# Patient Record
Sex: FEMALE | Race: OTHER | HISPANIC OR LATINO | Employment: FULL TIME | URBAN - METROPOLITAN AREA
[De-identification: names, ages, dates, MRNs, and addresses within clinical notes are randomized per-mention and may not be internally consistent; named-entity substitution may affect disease eponyms.]

---

## 2017-01-19 ENCOUNTER — GENERIC CONVERSION - ENCOUNTER (OUTPATIENT)
Dept: OTHER | Facility: OTHER | Age: 52
End: 2017-01-19

## 2017-03-24 ENCOUNTER — ALLSCRIPTS OFFICE VISIT (OUTPATIENT)
Dept: OTHER | Facility: OTHER | Age: 52
End: 2017-03-24

## 2017-03-24 DIAGNOSIS — M19.019 PRIMARY OSTEOARTHRITIS OF SHOULDER: ICD-10-CM

## 2017-03-24 DIAGNOSIS — Z12.31 ENCOUNTER FOR SCREENING MAMMOGRAM FOR MALIGNANT NEOPLASM OF BREAST: ICD-10-CM

## 2017-03-25 ENCOUNTER — HOSPITAL ENCOUNTER (OUTPATIENT)
Dept: RADIOLOGY | Facility: HOSPITAL | Age: 52
Discharge: HOME | End: 2017-03-25
Payer: COMMERCIAL

## 2017-03-25 DIAGNOSIS — M54.2 CERVICALGIA: ICD-10-CM

## 2017-03-25 DIAGNOSIS — M19.019 PRIMARY OSTEOARTHRITIS OF SHOULDER: ICD-10-CM

## 2017-03-25 PROCEDURE — 73030 X-RAY EXAM OF SHOULDER: CPT

## 2017-03-25 PROCEDURE — 72050 X-RAY EXAM NECK SPINE 4/5VWS: CPT

## 2017-03-27 ENCOUNTER — GENERIC CONVERSION - ENCOUNTER (OUTPATIENT)
Dept: OTHER | Facility: OTHER | Age: 52
End: 2017-03-27

## 2017-04-13 ENCOUNTER — TRANSCRIBE ORDERS (OUTPATIENT)
Dept: ADMINISTRATIVE | Facility: HOSPITAL | Age: 52
End: 2017-04-13

## 2017-04-13 DIAGNOSIS — R20.0 TACTILE ANESTHESIA: Primary | ICD-10-CM

## 2017-05-16 ENCOUNTER — ALLSCRIPTS OFFICE VISIT (OUTPATIENT)
Dept: OTHER | Facility: OTHER | Age: 52
End: 2017-05-16

## 2017-05-16 DIAGNOSIS — M54.50 LOW BACK PAIN: ICD-10-CM

## 2017-05-17 ENCOUNTER — GENERIC CONVERSION - ENCOUNTER (OUTPATIENT)
Dept: OTHER | Facility: OTHER | Age: 52
End: 2017-05-17

## 2017-05-22 ENCOUNTER — HOSPITAL ENCOUNTER (OUTPATIENT)
Dept: RADIOLOGY | Facility: HOSPITAL | Age: 52
Discharge: HOME | End: 2017-05-22
Payer: COMMERCIAL

## 2017-05-22 DIAGNOSIS — M54.50 LOW BACK PAIN: ICD-10-CM

## 2017-05-22 PROCEDURE — 72110 X-RAY EXAM L-2 SPINE 4/>VWS: CPT

## 2017-05-29 ENCOUNTER — GENERIC CONVERSION - ENCOUNTER (OUTPATIENT)
Dept: OTHER | Facility: OTHER | Age: 52
End: 2017-05-29

## 2017-06-09 ENCOUNTER — ALLSCRIPTS OFFICE VISIT (OUTPATIENT)
Dept: OTHER | Facility: OTHER | Age: 52
End: 2017-06-09

## 2017-06-16 ENCOUNTER — ALLSCRIPTS OFFICE VISIT (OUTPATIENT)
Dept: OTHER | Facility: OTHER | Age: 52
End: 2017-06-16

## 2017-06-16 DIAGNOSIS — M25.522 PAIN IN LEFT ELBOW: ICD-10-CM

## 2017-06-16 DIAGNOSIS — R53.83 OTHER FATIGUE: ICD-10-CM

## 2017-06-16 DIAGNOSIS — Z13.1 ENCOUNTER FOR SCREENING FOR DIABETES MELLITUS: ICD-10-CM

## 2017-06-16 DIAGNOSIS — Z13.220 ENCOUNTER FOR SCREENING FOR LIPOID DISORDERS: ICD-10-CM

## 2017-06-27 ENCOUNTER — APPOINTMENT (OUTPATIENT)
Dept: LAB | Facility: CLINIC | Age: 52
End: 2017-06-27

## 2017-06-27 ENCOUNTER — TRANSCRIBE ORDERS (OUTPATIENT)
Dept: LAB | Facility: CLINIC | Age: 52
End: 2017-06-27

## 2017-06-27 DIAGNOSIS — Z13.1 SCREENING FOR DIABETES MELLITUS: ICD-10-CM

## 2017-06-27 DIAGNOSIS — Z13.1 ENCOUNTER FOR SCREENING FOR DIABETES MELLITUS: ICD-10-CM

## 2017-06-27 DIAGNOSIS — R53.83 FATIGUE, UNSPECIFIED TYPE: Primary | ICD-10-CM

## 2017-06-27 DIAGNOSIS — R53.83 OTHER FATIGUE: ICD-10-CM

## 2017-06-27 DIAGNOSIS — Z13.220 ENCOUNTER FOR SCREENING FOR LIPOID DISORDERS: ICD-10-CM

## 2017-06-27 DIAGNOSIS — Z13.220 SCREENING FOR LIPOID DISORDERS: ICD-10-CM

## 2017-06-28 ENCOUNTER — APPOINTMENT (OUTPATIENT)
Dept: LAB | Facility: CLINIC | Age: 52
End: 2017-06-28
Payer: COMMERCIAL

## 2017-06-28 DIAGNOSIS — R53.83 FATIGUE, UNSPECIFIED TYPE: ICD-10-CM

## 2017-06-28 DIAGNOSIS — Z13.220 SCREENING FOR LIPOID DISORDERS: ICD-10-CM

## 2017-06-28 DIAGNOSIS — Z13.1 SCREENING FOR DIABETES MELLITUS: ICD-10-CM

## 2017-06-28 LAB
ALBUMIN SERPL BCP-MCNC: 4.1 G/DL (ref 3.5–5)
ALP SERPL-CCNC: 109 U/L (ref 46–116)
ALT SERPL W P-5'-P-CCNC: 24 U/L (ref 12–78)
ANION GAP SERPL CALCULATED.3IONS-SCNC: 8 MMOL/L (ref 4–13)
AST SERPL W P-5'-P-CCNC: 16 U/L (ref 5–45)
BACTERIA UR QL AUTO: ABNORMAL /HPF
BASOPHILS # BLD AUTO: 0.03 THOUSANDS/ΜL (ref 0–0.1)
BASOPHILS NFR BLD AUTO: 0 % (ref 0–1)
BILIRUB SERPL-MCNC: 0.35 MG/DL (ref 0.2–1)
BILIRUB UR QL STRIP: NEGATIVE
BUN SERPL-MCNC: 9 MG/DL (ref 5–25)
CALCIUM SERPL-MCNC: 9.5 MG/DL (ref 8.3–10.1)
CHLORIDE SERPL-SCNC: 106 MMOL/L (ref 100–108)
CHOLEST SERPL-MCNC: 192 MG/DL (ref 50–200)
CLARITY UR: ABNORMAL
CO2 SERPL-SCNC: 25 MMOL/L (ref 21–32)
COLOR UR: YELLOW
CREAT SERPL-MCNC: 0.59 MG/DL (ref 0.6–1.3)
CRP SERPL QL: <3 MG/L
EOSINOPHIL # BLD AUTO: 0.15 THOUSAND/ΜL (ref 0–0.61)
EOSINOPHIL NFR BLD AUTO: 2 % (ref 0–6)
ERYTHROCYTE [DISTWIDTH] IN BLOOD BY AUTOMATED COUNT: 12.7 % (ref 11.6–15.1)
ERYTHROCYTE [SEDIMENTATION RATE] IN BLOOD: 10 MM/HOUR (ref 0–20)
GFR SERPL CREATININE-BSD FRML MDRD: >60 ML/MIN/1.73SQ M
GLUCOSE P FAST SERPL-MCNC: 101 MG/DL (ref 65–99)
GLUCOSE UR STRIP-MCNC: NEGATIVE MG/DL
HCT VFR BLD AUTO: 42.8 % (ref 34.8–46.1)
HDLC SERPL-MCNC: 37 MG/DL (ref 40–60)
HGB BLD-MCNC: 14.9 G/DL (ref 11.5–15.4)
HGB UR QL STRIP.AUTO: NEGATIVE
HYALINE CASTS #/AREA URNS LPF: ABNORMAL /LPF
KETONES UR STRIP-MCNC: NEGATIVE MG/DL
LDLC SERPL CALC-MCNC: 117 MG/DL (ref 0–100)
LEUKOCYTE ESTERASE UR QL STRIP: ABNORMAL
LYMPHOCYTES # BLD AUTO: 1.93 THOUSANDS/ΜL (ref 0.6–4.47)
LYMPHOCYTES NFR BLD AUTO: 28 % (ref 14–44)
MCH RBC QN AUTO: 31.4 PG (ref 26.8–34.3)
MCHC RBC AUTO-ENTMCNC: 34.8 G/DL (ref 31.4–37.4)
MCV RBC AUTO: 90 FL (ref 82–98)
MONOCYTES # BLD AUTO: 0.53 THOUSAND/ΜL (ref 0.17–1.22)
MONOCYTES NFR BLD AUTO: 8 % (ref 4–12)
NEUTROPHILS # BLD AUTO: 4.15 THOUSANDS/ΜL (ref 1.85–7.62)
NEUTS SEG NFR BLD AUTO: 62 % (ref 43–75)
NITRITE UR QL STRIP: NEGATIVE
NON-SQ EPI CELLS URNS QL MICRO: ABNORMAL /HPF
NRBC BLD AUTO-RTO: 0 /100 WBCS
PH UR STRIP.AUTO: 6 [PH] (ref 4.5–8)
PLATELET # BLD AUTO: 259 THOUSANDS/UL (ref 149–390)
PMV BLD AUTO: 11.3 FL (ref 8.9–12.7)
POTASSIUM SERPL-SCNC: 3.7 MMOL/L (ref 3.5–5.3)
PROT SERPL-MCNC: 7.5 G/DL (ref 6.4–8.2)
PROT UR STRIP-MCNC: NEGATIVE MG/DL
RBC # BLD AUTO: 4.74 MILLION/UL (ref 3.81–5.12)
RBC #/AREA URNS AUTO: ABNORMAL /HPF
SODIUM SERPL-SCNC: 139 MMOL/L (ref 136–145)
SP GR UR STRIP.AUTO: 1.02 (ref 1–1.03)
TRIGL SERPL-MCNC: 190 MG/DL
TSH SERPL DL<=0.05 MIU/L-ACNC: 0.91 UIU/ML (ref 0.36–3.74)
UROBILINOGEN UR QL STRIP.AUTO: 0.2 E.U./DL
WBC # BLD AUTO: 6.8 THOUSAND/UL (ref 4.31–10.16)
WBC #/AREA URNS AUTO: ABNORMAL /HPF

## 2017-06-28 PROCEDURE — 86430 RHEUMATOID FACTOR TEST QUAL: CPT

## 2017-06-28 PROCEDURE — 80061 LIPID PANEL: CPT

## 2017-06-28 PROCEDURE — 85652 RBC SED RATE AUTOMATED: CPT

## 2017-06-28 PROCEDURE — 86618 LYME DISEASE ANTIBODY: CPT

## 2017-06-28 PROCEDURE — 80053 COMPREHEN METABOLIC PANEL: CPT

## 2017-06-28 PROCEDURE — 85025 COMPLETE CBC W/AUTO DIFF WBC: CPT

## 2017-06-28 PROCEDURE — 86140 C-REACTIVE PROTEIN: CPT

## 2017-06-28 PROCEDURE — 86038 ANTINUCLEAR ANTIBODIES: CPT

## 2017-06-28 PROCEDURE — 84443 ASSAY THYROID STIM HORMONE: CPT

## 2017-06-28 PROCEDURE — 36415 COLL VENOUS BLD VENIPUNCTURE: CPT

## 2017-06-28 PROCEDURE — 81001 URINALYSIS AUTO W/SCOPE: CPT

## 2017-06-29 LAB
B BURGDOR IGG SER IA-ACNC: 0.09
B BURGDOR IGM SER IA-ACNC: 0.75
RHEUMATOID FACT SER QL LA: NEGATIVE

## 2017-06-30 ENCOUNTER — ALLSCRIPTS OFFICE VISIT (OUTPATIENT)
Dept: OTHER | Facility: OTHER | Age: 52
End: 2017-06-30

## 2017-06-30 LAB — RYE IGE QN: NEGATIVE

## 2017-07-04 ENCOUNTER — GENERIC CONVERSION - ENCOUNTER (OUTPATIENT)
Dept: OTHER | Facility: OTHER | Age: 52
End: 2017-07-04

## 2017-07-27 ENCOUNTER — TRANSCRIBE ORDERS (OUTPATIENT)
Dept: ADMINISTRATIVE | Facility: HOSPITAL | Age: 52
End: 2017-07-27

## 2017-07-27 ENCOUNTER — ALLSCRIPTS OFFICE VISIT (OUTPATIENT)
Dept: OTHER | Facility: OTHER | Age: 52
End: 2017-07-27

## 2017-07-27 ENCOUNTER — LAB REQUISITION (OUTPATIENT)
Dept: LAB | Facility: HOSPITAL | Age: 52
End: 2017-07-27
Payer: COMMERCIAL

## 2017-07-27 DIAGNOSIS — R35.0 FREQUENCY OF MICTURITION: ICD-10-CM

## 2017-07-27 DIAGNOSIS — N20.0 CALCULUS OF KIDNEY: Primary | ICD-10-CM

## 2017-07-27 DIAGNOSIS — N20.0 CALCULUS OF KIDNEY: ICD-10-CM

## 2017-07-27 LAB
BILIRUB UR QL STRIP: NEGATIVE
BILIRUB UR QL STRIP: NORMAL
CLARITY UR: CLEAR
CLARITY UR: NORMAL
COLOR UR: YELLOW
COLOR UR: YELLOW
GLUCOSE (HISTORICAL): NORMAL
GLUCOSE UR STRIP-MCNC: NEGATIVE MG/DL
HGB UR QL STRIP.AUTO: NEGATIVE
HGB UR QL STRIP.AUTO: NORMAL
KETONES UR STRIP-MCNC: NEGATIVE MG/DL
KETONES UR STRIP-MCNC: NORMAL MG/DL
LEUKOCYTE ESTERASE UR QL STRIP: NEGATIVE
LEUKOCYTE ESTERASE UR QL STRIP: NORMAL
NITRITE UR QL STRIP: NEGATIVE
NITRITE UR QL STRIP: NORMAL
PH UR STRIP.AUTO: 6 [PH]
PH UR STRIP.AUTO: 6.5 [PH] (ref 4.5–8)
PROT UR STRIP-MCNC: NEGATIVE MG/DL
PROT UR STRIP-MCNC: NORMAL MG/DL
SP GR UR STRIP.AUTO: 1
SP GR UR STRIP.AUTO: 1 (ref 1–1.03)
UROBILINOGEN UR QL STRIP.AUTO: 0.2
UROBILINOGEN UR QL STRIP.AUTO: 0.2 E.U./DL

## 2017-07-27 PROCEDURE — 81003 URINALYSIS AUTO W/O SCOPE: CPT | Performed by: PHYSICIAN ASSISTANT

## 2017-07-28 ENCOUNTER — GENERIC CONVERSION - ENCOUNTER (OUTPATIENT)
Dept: OTHER | Facility: OTHER | Age: 52
End: 2017-07-28

## 2017-07-31 ENCOUNTER — HOSPITAL ENCOUNTER (OUTPATIENT)
Dept: CT IMAGING | Facility: HOSPITAL | Age: 52
Discharge: HOME | End: 2017-07-31
Payer: COMMERCIAL

## 2017-07-31 DIAGNOSIS — N20.0 CALCULUS OF KIDNEY: ICD-10-CM

## 2017-07-31 PROCEDURE — 74176 CT ABD & PELVIS W/O CONTRAST: CPT

## 2017-08-01 ENCOUNTER — GENERIC CONVERSION - ENCOUNTER (OUTPATIENT)
Dept: OTHER | Facility: OTHER | Age: 52
End: 2017-08-01

## 2017-09-15 ENCOUNTER — GENERIC CONVERSION - ENCOUNTER (OUTPATIENT)
Dept: OTHER | Facility: OTHER | Age: 52
End: 2017-09-15

## 2017-09-15 ENCOUNTER — LAB REQUISITION (OUTPATIENT)
Dept: LAB | Facility: HOSPITAL | Age: 52
End: 2017-09-15
Payer: COMMERCIAL

## 2017-09-15 ENCOUNTER — APPOINTMENT (OUTPATIENT)
Dept: LAB | Facility: HOSPITAL | Age: 52
End: 2017-09-15
Payer: COMMERCIAL

## 2017-09-15 DIAGNOSIS — F11.90 UNCOMPLICATED OPIOID USE: ICD-10-CM

## 2017-09-15 DIAGNOSIS — S12.9XXA FRACTURE OF CERVICAL VERTEBRA (HCC): ICD-10-CM

## 2017-09-15 DIAGNOSIS — M54.12 RADICULOPATHY OF CERVICAL REGION: ICD-10-CM

## 2017-09-15 PROCEDURE — 80365 DRUG SCREENING OXYCODONE: CPT | Performed by: PHYSICIAN ASSISTANT

## 2017-09-15 PROCEDURE — 80307 DRUG TEST PRSMV CHEM ANLYZR: CPT | Performed by: PHYSICIAN ASSISTANT

## 2017-09-21 LAB
OXYCODONE UR QL CFM: ABNORMAL NG/ML
OXYCODONE+OXYMORPHONE SERPLBLD QL SCN: POSITIVE
OXYCODONE+OXYMORPHONE UR QL SCN: NORMAL NG/ML
OXYCODONE+OXYMORPHONE UR QL SCN: POSITIVE
OXYMORPHONE UR CFM-MCNC: ABNORMAL NG/ML
OXYMORPHONE UR QL CFM: POSITIVE

## 2017-09-24 ENCOUNTER — GENERIC CONVERSION - ENCOUNTER (OUTPATIENT)
Dept: OTHER | Facility: OTHER | Age: 52
End: 2017-09-24

## 2017-09-29 LAB
AMPHETAMINES UR QL SCN: NEGATIVE NG/ML
BARBITURATES UR QL SCN: NEGATIVE NG/ML
BENZODIAZ UR QL SCN: NEGATIVE NG/ML
BZE UR QL SCN: NEGATIVE NG/ML
CANNABINOIDS UR QL SCN: NEGATIVE NG/ML
METHADONE UR QL SCN: NEGATIVE NG/ML
OPIATES UR QL: NEGATIVE
PCP UR QL: NEGATIVE NG/ML
PROPOXYPH UR QL: NEGATIVE NG/ML

## 2017-10-01 ENCOUNTER — GENERIC CONVERSION - ENCOUNTER (OUTPATIENT)
Dept: OTHER | Facility: OTHER | Age: 52
End: 2017-10-01

## 2017-10-17 ENCOUNTER — ALLSCRIPTS OFFICE VISIT (OUTPATIENT)
Dept: OTHER | Facility: OTHER | Age: 52
End: 2017-10-17

## 2017-10-19 NOTE — PROGRESS NOTES
Assessment  1  Pruritic rash (698 2) (L28 2)    Plan  Pruritic rash    · PredniSONE 10 MG Oral Tablet; TAKE 4 TABLETS DAILY FOR 3 DAYS,3 TABLETS  DAILY FOR 3 DAYS, 2 TABLETS DAILY FOR 3 DAYS AND 1 TABLET DAILY FOR 3  DAYS, THEN STOP    Discussion/Summary    Informed patient that rash may be due to the cleaning products that she uses at work  Make sure she wears appropriate protection when handling products  Would also recommending using soaps and detergents without fragments is  Prescribed prednisone to see if that helps with rash  If there is no improvement in symptoms make follow-up appointment  Possible side effects of new medications were reviewed with the patient/guardian today  The treatment plan was reviewed with the patient/guardian  The patient/guardian understands and agrees with the treatment plan     Self Referrals: No      Chief Complaint  1  Rash  Pt has been having rash x 3 days in different areas including her hands, pt states has a lot of itching and has been taking benadryl without any relief  History of Present Illness  HPI: 44-year-old female presenting for whole-body pruritic rash x3 days  Patient works  at [employer]  Patient has had several problems with 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 work over the past year  States that the rash started on her hands that appeared on her lower extremities and spread up her legs to her lower back  States that the rash is pruritic and has been taking Benadryl without any relief  Denies any changes in soaps, or detergents  Patient has no known food allergies  Denies any fevers, chills, difficulty breathing, wheezing  Review of Systems    Constitutional: No fever, no chills, feels well, no tiredness, no recent weight gain or loss  ENT: no ear ache, no loss of hearing, no nosebleeds or nasal discharge, no sore throat or hoarseness     Respiratory: no complaints of shortness of breath, no wheezing, no dyspnea on exertion, no orthopnea or PND  Integumentary: as noted in HPI  ROS reviewed  Active Problems  1  Anxiety disorder (300 00) (F41 9)   2  Arm weakness (729 89) (R29 898)   3  Asthma (493 90) (J45 909)   4  Biceps tendinopathy (727 9) (M67 929)   5  Cervical radiculopathy (723 4) (M54 12)   6  Chronic GERD (530 81) (K21 9)   7  Chronic left shoulder pain (719 41,338 29) (M25 512,G89 29)   8  Closed Fracture Of The Body Of The Navicular Bone Of The Left Foot (825 22)   9  Colon cancer screening (V76 51) (Z12 11)   10  Compression fracture of cervical vertebra (805 00) (S12 9XXA)   11  Cyst of skin (706 2) (L72 9)   12  Depression with anxiety (300 4) (F41 8)   13  Edema leg (782 3) (R60 0)   14  Elbow pain, right (719 42) (M25 521)   15  Encounter for mammogram to establish baseline mammogram (V76 12) (Z12 31)   16  Encounter for screening mammogram for breast cancer (V76 12) (Z12 31)   17  Esophagitis, reflux (530 11) (K21 0)   18  Fatigue (780 79) (R53 83)   19  Flank pain (789 09) (R10 9)   20  Gastroparesis (536 3) (K31 84)   21  Greater trochanteric bursitis of left hip (726 5) (M70 62)   22  Hip pain, acute, left (719 45) (M25 552)   23  Hyperlipidemia (272 4) (E78 5)   24  Hypertension (401 9) (I10)   25  Irritable bowel syndrome (564 1) (K58 9)   26  Left arm numbness (782 0) (R20 0)   27  Left elbow pain (719 42) (M25 522)   28  Low back pain (724 2) (M54 5)   29  Migraine headache (346 90) (G43 909)   30  Narcotic drug use (305 50) (F11 90)   31  Neck pain (723 1) (M54 2)   32  Nephrolithiasis (592 0) (N20 0)   33  Numbness Of Both Hands (782 0)   34  Osteoarthritis of shoulder (715 91) (M19 019)   35  Other chronic pain (338 29) (G89 29)   36  Pain in joint of right wrist (719 43) (M25 531)   37  Pain medication agreement (V58 69) (Z02 89)   38  Pancreatic disorder (577 9) (K86 9)   39  Prediabetes (790 29) (R73 09)   40  Pre-procedural cardiovascular examination (V72 81) (Z01 810)   41   Right arm numbness (782 0) (R20 2)   42  Screening cholesterol level (V77 91) (Z13 220)   43  Screening for diabetes mellitus (DM) (V77 1) (Z13 1)   44  Seizure, epileptic (345 90) (G40 909)   45  Seizures (780 39) (R56 9)   46  Shoulder joint pain, unspecified laterality   47  Tarsal tunnel syndrome, unspecified laterality (355 5) (G57 50)   48  Tongue laceration (873 64) (S01 512A)   49  Urinary frequency (788 41) (R35 0)   50  UTI (lower urinary tract infection) (599 0) (N39 0)   51  Vitamin D deficiency (268 9) (E55 9)    Past Medical History  1  Denied: History of drug abuse   2  History of Pain in joint of right wrist (719 43) (M25 531)    Family History  Mother    1  Family history of Coronary Artery Disease (V17 49)   2  Family history of Diabetes Mellitus (V18 0)   3  Family history of depression (V17 0) (Z81 8)   4  Family history of Gastric Cancer (V16 0)  Father    5  Family history of Coronary Artery Disease (V17 49)   6  Family history of alcohol abuse (V61 41) (Z81 1)   7  Family history of Hypertension (V17 49)   8  Family history of Renal Failure    Social History   · Current Every Day Smoker (305 1)   · Narcotic drug use (305 50) (F11 90)   · No alcohol use   · No drug use   · Patient unsure of advance healthcare directive status   · Primary language is Mohawk   · [employer]    Surgical History  1  History of Cholecystectomy Laparoscopic   2  History of Condylotomy Of TMJ   3  History of Foot Surgery   4  History of Hysterectomy   5  History of Ovarian Cystectomy   6  History of Shoulder Surgery   7  History of Tubal Ligation   8  History of Wrist Surgery    Current Meds   1  Advair Diskus 500-50 MCG/DOSE Inhalation Aerosol Powder Breath Activated; inhale 1   puff twice daily; Therapy: 49Fpc7860 to ([id number])  Requested for: 70VME6024; Last   Rx:11Oct2017 Ordered   2  Dicyclomine HCl - 10 MG Oral Capsule; TAKE 2 CAPSULES 4 TIMES DAILY;    Therapy: 94Wcp4707 to (Renew:16Oct2014)  Requested for: 15Njz2134; Last   Rx:18Lgw7105 Ordered   3  Fenofibrate 145 MG Oral Tablet; TAKE 1 TABLET DAILY; Therapy: 14EIH7597 to (Evaluate:14Mar2018)  Requested for: 27Hsg1295; Last   Rx:02Cyh4288 Ordered   4  Furosemide 20 MG Oral Tablet; TAKE 1 TABLET DAILY AS DIRECTED; Therapy: 63OXY4190 to (Evaluate:12Apr2017)  Requested for: 54HMN1464; Last   Rx:45Nww2665 Ordered   5  HydrOXYzine HCl - 50 MG Oral Tablet; TAKE 1 TABLET 3-4 TIMES DAILY; Therapy: 77LZR4346 to (Evaluate:85Fyu5289)  Requested for: 77JNV9968; Last   Rx:01Nil2946 Ordered   6  LamoTRIgine 100 MG Oral Tablet; TAKE 1 TABLET DAILY; Therapy: 35ZWZ6852 to Recorded   7  Lisinopril 10 MG Oral Tablet; take one tablet by mouth daily; Therapy: 01Apr2016 to (Evaluate:13Mar2018)  Requested for: 31Dnf7424; Last   Rx:55Wfs7431 Ordered   8  Methocarbamol 500 MG Oral Tablet; TAKE 1 TABLET 3 TIMES DAILY; Therapy: 19KJN9481 to (Evaluate:03Jan2018)  Requested for: 72URZ9798; Last   Rx:66Ocr4986 Ordered   9  Montelukast Sodium 10 MG Oral Tablet; TAKE 1 TABLET DAILY; Therapy: 79Mrf2460 to (Evaluate:14Mar2018)  Requested for: 27Pob4380; Last   Rx:17Yuz8734 Ordered   10  Omeprazole 20 MG Oral Capsule Delayed Release; TAKE 1 CAPSULE DAILY; Therapy: 70IUX3668 to (Evaluate:15Mar2017)  Requested for: 55GXR8594; Last    Rx:15Aeo5713 Ordered   11  OxyCODONE HCl - 30 MG Oral Tablet; TAKE 1 TABLET EVERY 6 HOURS AS NEEDED    FOR PAIN;    Therapy: 29CQQ3977 to (Evaluate:11Nov2017); Last Rx:13Oct2017 Ordered   12  PARoxetine HCl - 20 MG Oral Tablet; TAKE 1 TABLET BY MOUTH DAILY   AT BEDTIME; Therapy: 10CQK3464 to (Evaluate:29Apr2016)  Requested for: 81GCF3174; Last    Rx:30Mar2016 Ordered   13  Simvastatin 20 MG Oral Tablet; TAKE TAKE ONE TABLET DAILY; Therapy: 69EBK0684 to (Evaluate:12Apr2017)  Requested for: 32RKP2382; Last    Rx:14Oct2016 Ordered   14   Ventolin  (90 Base) MCG/ACT Inhalation Aerosol Solution; INHALE 1 - 2 PUFFS    BY MOUTH EVERY 4-6 HOURS AS NEEDED; Therapy: 18Apr2016 to (Evaluate:25Nov2017)  Requested for: 50SXF3438; Last    Rx:76Ymd8891 Ordered    Allergies  1  Aspirin Buffered TABS   2  Codeine Sulfate TABS   3  Compazine TABS   4  Gabapentin CAPS   5  Imitrex TABS   6  Morphine Sulfate TABS   7  Toradol Oral TABS   8  Vicodin TABS    Vitals   Recorded: 85IMV0496 10:24AM   Temperature 96 F, Tympanic   Heart Rate 80   Respiration 18   Systolic 161   Diastolic 60   Height 5 ft 2 in   Weight 126 lb    BMI Calculated 23 05   BSA Calculated 1 57     Physical Exam    Constitutional   General appearance: No acute distress, well appearing and well nourished  Pulmonary   Respiratory effort: No increased work of breathing or signs of respiratory distress  Auscultation of lungs: Clear to auscultation  Cardiovascular   Auscultation of heart: Normal rate and rhythm, normal S1 and S2, without murmurs  Skin   Examination of the skin for lesions: Abnormal  -- Erythematous patches and wheals noted on bilateral hands, and bilateral lower extremities          Signatures   Electronically signed by : DAMIR Persaud; Oct 17 2017 12:30PM EST                       (Author)    Electronically signed by : LAYLA Herrera ; Oct 18 2017  1:07PM EST

## 2017-11-09 ENCOUNTER — GENERIC CONVERSION - ENCOUNTER (OUTPATIENT)
Dept: OTHER | Facility: OTHER | Age: 52
End: 2017-11-09

## 2017-11-09 DIAGNOSIS — M79.672 PAIN OF LEFT FOOT: ICD-10-CM

## 2017-11-15 ENCOUNTER — HOSPITAL ENCOUNTER (OUTPATIENT)
Dept: RADIOLOGY | Facility: HOSPITAL | Age: 52
Discharge: HOME | End: 2017-11-15
Payer: COMMERCIAL

## 2017-11-15 DIAGNOSIS — M79.672 PAIN OF LEFT FOOT: ICD-10-CM

## 2017-11-15 PROCEDURE — 73630 X-RAY EXAM OF FOOT: CPT

## 2017-11-21 ENCOUNTER — GENERIC CONVERSION - ENCOUNTER (OUTPATIENT)
Dept: OTHER | Facility: OTHER | Age: 52
End: 2017-11-21

## 2018-01-02 ENCOUNTER — ALLSCRIPTS OFFICE VISIT (OUTPATIENT)
Dept: OTHER | Facility: OTHER | Age: 53
End: 2018-01-02

## 2018-01-03 NOTE — PROGRESS NOTES
Assessment   1  Anxiety disorder (300 00) (F41 9)   2  Compression fracture of cervical vertebra (805 00) (S12 9XXA)   3  Chronic left shoulder pain (719 41,338 29) (M25 512,G89 29)    Plan   Chronic left shoulder pain, Greater trochanteric bursitis of left hip    · OxyCODONE HCl - 30 MG Oral Tablet; TAKE 1 TABLET EVERY 6 HOURS AS    NEEDED FOR PAIN  Depression with anxiety    · PARoxetine HCl - 20 MG Oral Tablet; TAKE 1 TABLET BY MOUTH DAILY   AT    BEDTIME    Discussion/Summary      For anxiety informed patient that with the crease stressors may be increasing her symptoms of anxiety  To help better control this would be to decrease the amount of stressors at home and at work  In the meantime will also start again on Paxil to see if this helps  If symptoms do improve can always consider stopping the Paxil again in the future  If there is any concern with continuing chest tightness and palpitations would recommend making a follow-up appointment  We will consider doing a Holter monitor at that time  compression fracture and also chronic pain will continue with the oxycodone  Urine drug screen in September came back positive for oxycodone and negative for everything else  [place] drug database was reviewed today, and no concerns at this time  in 3 months  Possible side effects of new medications were reviewed with the patient/guardian today  The treatment plan was reviewed with the patient/guardian  The patient/guardian understands and agrees with the treatment plan       Self Referrals: No      Chief Complaint   [facility] ED FU for anxiety, pt states she is still feeling the symptoms  History of Present Illness   HPI: 50-year-old female presenting for follow-up of anxiety  Patient states he was having an anxiety attack at work on Sunday and went to the emergency room from there  States that that time she was having tachycardia, palpitations, shortness of breath, shaking   It is also noted that her blood pressure was elevated  Patient had a cardiac workup which was negative in the emergency room  Lab work was also normal  Patient states that she is doing better today  Is still getting palpitations, and also episodes shaking this  Currently she is on hydroxyzine 4 times a day as needed for anxiety  In the past she was also on Paxil daily  Has not had medication in over a year  Recently there has been increased stressors at work  She has been doing double shifts  Nose also increase stressors at home  She states she was recently kicked out of her house by her daughter-in-law  Has found a new place to live though  is also due for refill of her oxycodone  States medication has been helping with the pain  No concerns or questions today  Review of Systems        Constitutional: no fever,-- not feeling poorly-- and-- no chills  Cardiovascular: palpitations, but-- the heart rate was not slow,-- no chest pain,-- no intermittent leg claudication,-- the heart rate was not fast-- and-- no lower extremity edema  Respiratory: no complaints of shortness of breath, no wheezing, no dyspnea on exertion, no orthopnea or PND  Gastrointestinal: no complaints of abdominal pain, no constipation, no nausea or diarrhea, no vomiting, no bloody stools  Neurological: as noted in HPI  ROS reviewed  Active Problems   1  Anxiety disorder (300 00) (F41 9)   2  Arm weakness (729 89) (R29 898)   3  Asthma (493 90) (J45 909)   4  Biceps tendinopathy (727 9) (M67 929)   5  Cervical radiculopathy (723 4) (M54 12)   6  Chronic GERD (530 81) (K21 9)   7  Chronic left shoulder pain (719 41,338 29) (M25 512,G89 29)   8  Closed Fracture Of The Body Of The Navicular Bone Of The Left Foot (825 22)   9  Colon cancer screening (V76 51) (Z12 11)   10  Compression fracture of cervical vertebra (805 00) (S12 9XXA)   11  Cyst of skin (706 2) (L72 9)   12  Depression with anxiety (300 4) (F41 8)   13   Edema leg (782 3) (R60 0) 14  Elbow pain, right (719 42) (M25 521)   15  Encounter for mammogram to establish baseline mammogram (V76 12) (Z12 31)   16  Encounter for screening mammogram for breast cancer (V76 12) (Z12 31)   17  Esophagitis, reflux (530 11) (K21 0)   18  Fatigue (780 79) (R53 83)   19  Flank pain (789 09) (R10 9)   20  Gastroparesis (536 3) (K31 84)   21  Greater trochanteric bursitis of left hip (726 5) (M70 62)   22  Hip pain, acute, left (719 45) (M25 552)   23  Hyperlipidemia (272 4) (E78 5)   24  Hypertension (401 9) (I10)   25  Irritable bowel syndrome (564 1) (K58 9)   26  Left arm numbness (782 0) (R20 0)   27  Left elbow pain (719 42) (M25 522)   28  Left foot pain (729 5) (M79 672)   29  Low back pain (724 2) (M54 5)   30  Migraine headache (346 90) (G43 909)   31  Narcotic drug use (305 50) (F11 90)   32  Neck pain (723 1) (M54 2)   33  Nephrolithiasis (592 0) (N20 0)   34  Numbness Of Both Hands (782 0)   35  Osteoarthritis of shoulder (715 91) (M19 019)   36  Other chronic pain (338 29) (G89 29)   37  Pain in joint of right wrist (719 43) (M25 531)   38  Pain medication agreement (V58 69) (Z02 89)   39  Pancreatic disorder (577 9) (K86 9)   40  Prediabetes (790 29) (R73 09)   41  Pre-procedural cardiovascular examination (V72 81) (Z01 810)   42  Pruritic rash (698 2) (L28 2)   43  Right arm numbness (782 0) (R20 2)   44  Screening cholesterol level (V77 91) (Z13 220)   45  Screening for diabetes mellitus (DM) (V77 1) (Z13 1)   46  Seizure, epileptic (345 90) (G40 909)   47  Seizures (780 39) (R56 9)   48  Shoulder joint pain, unspecified laterality   49  Tarsal tunnel syndrome, unspecified laterality (355 5) (G57 50)   50  Tongue laceration (873 64) (S01 512A)   51  Urinary frequency (788 41) (R35 0)   52  UTI (lower urinary tract infection) (599 0) (N39 0)   53  Vitamin D deficiency (268 9) (E55 9)    Past Medical History   1  Denied: History of drug abuse   2   History of Pain in joint of right wrist (719 43) (M23 56)    Family History   Mother    1  Family history of Coronary Artery Disease (V17 49)   2  Family history of Diabetes Mellitus (V18 0)   3  Family history of depression (V17 0) (Z81 8)   4  Family history of Gastric Cancer (V16 0)  Father    5  Family history of Coronary Artery Disease (V17 49)   6  Family history of alcohol abuse (V61 41) (Z81 1)   7  Family history of Hypertension (V17 49)   8  Family history of Renal Failure    Social History    · Current Every Day Smoker (305 1)   · Narcotic drug use (305 50) (F11 90)   · No alcohol use   · No drug use   · Patient unsure of advance healthcare directive status   · Primary language is [place]   · [employer]    Surgical History   1  History of Cholecystectomy Laparoscopic   2  History of Condylotomy Of TMJ   3  History of Foot Surgery   4  History of Hysterectomy   5  History of Ovarian Cystectomy   6  History of Shoulder Surgery   7  History of Tubal Ligation   8  History of Wrist Surgery    Current Meds    1  Advair Diskus 500-50 MCG/DOSE Inhalation Aerosol Powder Breath Activated; inhale 1     puff twice daily; Therapy: 99Usk1560 to (0489 33 97 26)  Requested for: 24HFU5204; Last     Rx:11Oct2017 Ordered   2  Dicyclomine HCl - 10 MG Oral Capsule; TAKE 2 CAPSULES 4 TIMES DAILY; Therapy: 16Rkp7345 to (Renew:16Oct2014)  Requested for: 71Gwf8654; Last     Rx:90Uzj9209 Ordered   3  Fenofibrate 145 MG Oral Tablet; TAKE 1 TABLET DAILY; Therapy: 08WJM6387 to (Evaluate:05Jun2018)  Requested for: 14CAW3763; Last     Rx:37Mkf8476 Ordered   4  Furosemide 20 MG Oral Tablet; TAKE 1 TABLET DAILY AS DIRECTED; Therapy: 15ZTL3782 to (Evaluate:12Apr2017)  Requested for: 42KJA2324; Last     Rx:92Fxa8597 Ordered   5  HydrOXYzine HCl - 50 MG Oral Tablet; TAKE 1 TABLET 3-4 TIMES DAILY; Therapy: 10PXB1438 to (78 649 946)  Requested for: 53-41-43-35; Last     RQ:20LKO8547 Ordered   6   LamoTRIgine 100 MG Oral Tablet; TAKE 1 TABLET DAILY; Therapy: 63UQX8881 to Recorded   7  Lisinopril 10 MG Oral Tablet; take one tablet by mouth daily; Therapy: 24Rvz3989 to ([place])  Requested for: 21Nov2017; Last     Rx:21Nov2017 Ordered   8  Methocarbamol 500 MG Oral Tablet; TAKE 1 TABLET 3 TIMES DAILY; Therapy: 64QCY3702 to (Evaluate:15Mar2018)  Requested for: 31NLS3198; Last     Rx:44Ylm7566 Ordered   9  Montelukast Sodium 10 MG Oral Tablet; TAKE 1 TABLET DAILY; Therapy: 01Zgq1549 to (Evaluate:05Jun2018)  Requested for: 14AOY7718; Last     Rx:42Gij0844 Ordered   10  Omeprazole 20 MG Oral Capsule Delayed Release; TAKE 1 CAPSULE DAILY; Therapy: 83NFG4481 to (Evaluate:15Mar2017)  Requested for: 50EGO0123; Last      Rx:89Baa5759 Ordered   11  OxyCODONE HCl - 30 MG Oral Tablet; TAKE 1 TABLET EVERY 6 HOURS AS NEEDED      FOR PAIN;      Therapy: 99VZN7641 to (Evaluate:05Jan2018); Last Rx:75Eic4089 Ordered   12  PARoxetine HCl - 20 MG Oral Tablet; TAKE 1 TABLET BY MOUTH DAILY   AT BEDTIME; Therapy: 50WQI2720 to (Evaluate:29Apr2016)  Requested for: 19FSA6542; Last      Rx:30Mar2016 Ordered   13  PredniSONE 10 MG Oral Tablet; TAKE 4 TABLETS DAILY FOR 3 DAYS,3 TABLETS      DAILY FOR 3 DAYS, 2 TABLETS DAILY FOR 3 DAYS AND 1 TABLET DAILY FOR      3 DAYS, THEN STOP; Therapy: 49PAX1079 to (96 260980)  Requested for: 58AAD8037; Last      Rx:74Plx0461 Ordered   14  Simvastatin 20 MG Oral Tablet; TAKE TAKE ONE TABLET DAILY; Therapy: 60WYX2169 to (Evaluate:12Apr2017)  Requested for: 82SUD5989; Last      Rx:68Bgi9786 Ordered   15  Ventolin  (90 Base) MCG/ACT Inhalation Aerosol Solution; INHALE 1 - 2 PUFFS      BY MOUTH EVERY 4-6 HOURS AS NEEDED; Therapy: 14Vaj1848 to (Evaluate:98Olt4291)  Requested for: 24XPQ9582; Last      Rx:39Wcm4990 Ordered     The medication list was reviewed and updated today  Allergies   1  Aspirin Buffered TABS   2  Codeine Sulfate TABS   3  Compazine TABS   4  Gabapentin CAPS   5   Imitrex TABS   6  Morphine Sulfate TABS   7  Toradol Oral TABS   8  Vicodin TABS    Vitals    Recorded: 29ZIV0017 08:37AM   Temperature 94 9 F   Heart Rate 98   Respiration 18   Systolic 347   Diastolic 84   Height 5 ft 2 in   Weight 118 lb 2 oz   BMI Calculated 21 61   BSA Calculated 1 53   O2 Saturation 99     Physical Exam        Constitutional      General appearance: No acute distress, well appearing and well nourished  Eyes      Conjunctiva and lids: No swelling, erythema or discharge  Pupils and irises: Equal, round and reactive to light  Pulmonary      Respiratory effort: No increased work of breathing or signs of respiratory distress  Auscultation of lungs: Clear to auscultation  Cardiovascular      Auscultation of heart: Normal rate and rhythm, normal S1 and S2, without murmurs  Examination of extremities for edema and/or varicosities: Normal        Abdomen      Abdomen: Non-tender, no masses  Liver and spleen: No hepatomegaly or splenomegaly  Lymphatic      Palpation of lymph nodes in neck: No lymphadenopathy  Neurologic      Cranial nerves: Cranial nerves 2-12 intact         Psychiatric      Orientation to person, place, and time: Normal        Mood and affect: Normal           Signatures    Electronically signed by : DAMIR Koehler; Jan 2 2018  9:29AM EST                       (Author)     Electronically signed by : LAYLA Frey ; Jan 2 2018  9:51AM EST

## 2018-01-09 NOTE — RESULT NOTES
Verified Results  * XR SPINE CERVICAL COMPLETE 4 OR 5 VW NON INJURY 25Mar2017 09:33AM Benji Ureña Order Number: [id number]     Test Name Result Flag Reference   XR SPINE CERVICAL COMPLETE 4 OR 5 VW (Report)     CERVICAL SPINE     INDICATION: Cervicalgia  COMPARISON: Cervical spine x-ray dated November 7, 2006  VIEWS: 5     IMAGES: 5     FINDINGS:     No evidence of fracture or subluxation  There is mild chronic-appearing compression deformity with sclerotic changes at C5 and C6  These findings are new when compared to the prior exam      There is mild disc space narrowing at C5/C6  The neural foramina are patent  The prevertebral soft tissues are within normal limits  The lung apices are intact  IMPRESSION:     No acute cervical spine fracture or subluxation  Mild multilevel spondylitic degenerative changes  Minimal/mild chronic-appearing compression deformity of the C5 and C6 vertebrae with sclerotic changes  If symptoms of cervicalgia persist, an MRI of the cervical spine could be performed for further evaluation  Workstation performed: [id number]     Signed by:   Cece Benavidez MD   3/26/17     * XR SHOULDER 2+ VIEW LEFT 82OYE7184 09:33AM Benjijeaneth Reevessapphire Order Number: [id number]     Test Name Result Flag Reference   XR SHOULDER 2+ VW LEFT (Report)     LEFT SHOULDER     INDICATION: Left shoulder pain     COMPARISON: 5/29/2016     VIEWS: 3     IMAGES: 4     FINDINGS:     There is no acute fracture or dislocation  The left AC joint appears widened, either postoperative or posttraumatic, but unchanged from prior  There is arthritis of the glenohumeral joint with marginal hypertrophic spurring     No lytic or blastic lesions are seen  Soft tissues are unremarkable  IMPRESSION:       1  Unchanged widening of the left acromial clavicular joint   2   Left glenohumeral joint osteoarthritis       Workstation performed: [id number]     Signed by:   Bebo Riley MD   3/26/17       Plan  Colon cancer screening    · COLONOSCOPY; Status:Active; Requested for:24Mar2017;   Encounter for screening mammogram for breast cancer    · * MAMMO SCREENING BILATERAL W CAD; Status:Active;  Requested for:24Mar2017;   Left arm numbness    · EMG TWO EXTREMITIES WITH OR W/O RELATED PARASPINAL AREAS; Status:Hold  For - Scheduling; Requested for:27Mar2017;   TWO : DAVID  ONE : KRISTINA

## 2018-01-10 NOTE — RESULT NOTES
Verified Results  (1) OXYCODONE/OXYMORPHONE, URINE 46Kbh0894 03:34PM Bakari Nava     Test Name Result Flag Reference   OXYCODONE/OXYMORPHONE Positive A Zdoycx=790   Test includes Oxycodone and Oxymorphone   OXYCODONE Positive A    OXYMORPHONE Positive A    OXYMORPHONE GC/MS >71360 ng/mL  Jjezqc=307   Performed at:  [address] [place], [place]  [id number]  : Chikis Motley MD, Phone:  [phone]   OXYCODONE (GC/MS) >23214 ng/mL  Yrmbag=373

## 2018-01-11 ENCOUNTER — ALLSCRIPTS OFFICE VISIT (OUTPATIENT)
Dept: OTHER | Facility: OTHER | Age: 53
End: 2018-01-11

## 2018-01-11 NOTE — MISCELLANEOUS
Message  Return to work or school:   Germaine Faust is under my professional care  She was seen in my office on 5/16/17     She is able to work with limitations (Unable to lift anything greater than 15 pounds  Reevaluate 8/16/17 )           Signatures   Electronically signed by : DAMIR Evans; May 17 2017 12:59PM EST                       (Author)    Electronically signed by : Nicole Evans; May 17 2017  3:58PM EST                       (Author)

## 2018-01-11 NOTE — RESULT NOTES
Verified Results  (1) DRUG ABUSE SCREEN, URINE ROUTINE [id number] 07:57PM [facility] Order Number: [id number]     Test Name Result Flag Reference   AMPHETAMINE SCREEN URINE Negative ng/mL  Crmyzx=3958   Amphetamine test includes Amphetamine and Methamphetamine  BARBITURATE SCREEN URINE Negative ng/mL  Pqcwtr=023   BENZODIAZEPINE SCREEN, URINE Negative ng/mL  Dkymow=525   CANNABINOID SCREEN URINE Negative ng/mL  Cutoff=50   COCAINE(METAB  )SCREEN, URINE Negative ng/mL  Xjbviw=340   METHADONE SCREEN, URINE Negative ng/mL  Ccnbet=104   OPIATE SCREEN URINE Positive A Tkzwfd=934   Opiate test includes Codeine and Morphine only      Codeine                   Negative            Tyhupe=025            01    Morphine                  Positive        A                         01    Morphine GC/MS Conf       >69641             ng/mL Spszmo=052       01   PHENCYCLIDINE (PCP), QUAL, UR Negative ng/mL  Cutoff=25   PROPOXYPHENE, SCREEN Negative ng/mL  Zndjud=088   Performed at:  [facility] [address]  [id number]  : Estrella Verdin MD, Phone:  [phone]

## 2018-01-12 VITALS
BODY MASS INDEX: 23.07 KG/M2 | HEIGHT: 62 IN | TEMPERATURE: 94.1 F | WEIGHT: 125.38 LBS | SYSTOLIC BLOOD PRESSURE: 122 MMHG | RESPIRATION RATE: 18 BRPM | DIASTOLIC BLOOD PRESSURE: 76 MMHG | HEART RATE: 78 BPM

## 2018-01-12 NOTE — MISCELLANEOUS
Message  Return to work or school:   Salazar Wong is under my professional care  She was seen in my office on 12/15/16     She is able to work with limitations (Cannot lift heavy linen bags due to shoulder pain  )           Signatures   Electronically signed by : DAMIR Quintana; Jan 19 2017  7:36PM EST                       (Author)

## 2018-01-13 VITALS
SYSTOLIC BLOOD PRESSURE: 121 MMHG | HEART RATE: 77 BPM | BODY MASS INDEX: 24.29 KG/M2 | DIASTOLIC BLOOD PRESSURE: 82 MMHG | WEIGHT: 132 LBS | TEMPERATURE: 96.1 F | RESPIRATION RATE: 18 BRPM | HEIGHT: 62 IN | OXYGEN SATURATION: 98 %

## 2018-01-13 VITALS
DIASTOLIC BLOOD PRESSURE: 68 MMHG | TEMPERATURE: 96.3 F | OXYGEN SATURATION: 98 % | SYSTOLIC BLOOD PRESSURE: 128 MMHG | RESPIRATION RATE: 18 BRPM | WEIGHT: 130 LBS | HEIGHT: 62 IN | BODY MASS INDEX: 23.92 KG/M2 | HEART RATE: 76 BPM

## 2018-01-14 VITALS
BODY MASS INDEX: 23.19 KG/M2 | WEIGHT: 126 LBS | HEIGHT: 62 IN | TEMPERATURE: 96 F | RESPIRATION RATE: 18 BRPM | HEART RATE: 80 BPM | SYSTOLIC BLOOD PRESSURE: 100 MMHG | DIASTOLIC BLOOD PRESSURE: 60 MMHG

## 2018-01-14 VITALS
WEIGHT: 128 LBS | RESPIRATION RATE: 18 BRPM | DIASTOLIC BLOOD PRESSURE: 80 MMHG | HEIGHT: 62 IN | OXYGEN SATURATION: 97 % | HEART RATE: 74 BPM | SYSTOLIC BLOOD PRESSURE: 124 MMHG | BODY MASS INDEX: 23.55 KG/M2 | TEMPERATURE: 97 F

## 2018-01-14 VITALS
DIASTOLIC BLOOD PRESSURE: 84 MMHG | TEMPERATURE: 96 F | BODY MASS INDEX: 24.37 KG/M2 | SYSTOLIC BLOOD PRESSURE: 120 MMHG | HEIGHT: 62 IN | RESPIRATION RATE: 18 BRPM | WEIGHT: 132.44 LBS | HEART RATE: 78 BPM | OXYGEN SATURATION: 97 %

## 2018-01-14 VITALS
HEART RATE: 66 BPM | WEIGHT: 130 LBS | BODY MASS INDEX: 23.92 KG/M2 | TEMPERATURE: 95.3 F | RESPIRATION RATE: 18 BRPM | HEIGHT: 62 IN | OXYGEN SATURATION: 99 % | DIASTOLIC BLOOD PRESSURE: 84 MMHG | SYSTOLIC BLOOD PRESSURE: 122 MMHG

## 2018-01-14 NOTE — RESULT NOTES
Verified Results  (1) URINALYSIS w URINE C/S REFLEX (will reflex a microscopy if leukocytes, occult blood, or nitrites are not within normal limits) [id number] 05:08PM [facility]     Test Name Result Flag Reference   COLOR Yellow     CLARITY Clear     PH UA 6 5  4 5-8 0   LEUKOCYTE ESTERASE UA Negative  Negative   NITRITE UA Negative  Negative   PROTEIN UA Negative mg/dl  Negative   GLUCOSE UA Negative mg/dl  Negative   KETONES UA Negative mg/dl  Negative   UROBILINOGEN UA 0 2 E U /dl  0 2, 1 0 E U /dl   BILIRUBIN UA Negative  Negative   BLOOD UA Negative  Negative   SPECIFIC GRAVITY UA 1 005  1 003-1 030

## 2018-01-14 NOTE — PROGRESS NOTES
Chief Complaint  Pt is here for  script for oxycodone  Pt performed test date/time 11/18/16 at 4:42 pm done by [facility]  Pt states took oxycodone last time this morning  Active Problems    1  Anxiety disorder (300 00) (F41 9)   2  Arm weakness (729 89) (M62 81)   3  Asthma (493 90) (J45 909)   4  Biceps tendinopathy (727 9) (M67 929)   5  Chronic GERD (530 81) (K21 9)   6  Closed Fracture Of The Body Of The Navicular Bone Of The Left Foot (825 22)   7  Colon cancer screening (V76 51) (Z12 11)   8  Cyst of skin (706 2) (L72 9)   9  Depression with anxiety (300 4) (F41 8)   10  Edema leg (782 3) (R60 0)   11  Elbow pain, right (719 42) (M25 521)   12  Encounter for mammogram to establish baseline mammogram (V76 12) (Z12 31)   13  Esophagitis, reflux (530 11) (K21 0)   14  Fatigue (780 79) (R53 83)   15  Flank pain (789 09) (R10 9)   16  Gastroparesis (536 3) (K31 84)   17  Greater trochanteric bursitis of left hip (726 5) (M70 62)   18  Hip pain, acute, left (719 45) (M25 552)   19  Hyperlipidemia (272 4) (E78 5)   20  Hypertension (401 9) (I10)   21  Irritable bowel syndrome (564 1) (K58 9)   22  Migraine headache (346 90) (G43 909)   23  Numbness Of Both Hands (782 0)   24  Osteoarthritis of shoulder (715 91) (M19 019)   25  Other chronic pain (338 29) (G89 29)   26  Pain in joint of right wrist (719 43) (M25 531)   27  Pain medication agreement (V58 69) (Z79 899)   28  Pancreatic disorder (577 9) (K86 9)   29  Prediabetes (790 29) (R73 09)   30  Pre-procedural cardiovascular examination (V72 81) (Z01 810)   31  Seizure, epileptic (345 90) (G40 909)   32  Seizures (780 39) (R56 9)   33  Shoulder joint pain (719 41) (M25 519)   34  Shoulder joint pain, unspecified laterality   35  Tarsal tunnel syndrome, unspecified laterality (355 5) (G57 50)   36  Tongue laceration (873 64) (S01 512A)   37  UTI (lower urinary tract infection) (599 0) (N39 0)   38  Vitamin D deficiency (268 9) (E55 9)    Current Meds   1  Advair Diskus 250-50 MCG/DOSE Inhalation Aerosol Powder Breath Activated; INHALE   1 PUFF EVERY 12 HOURS; Therapy: 26Nwg3265 to (Evaluate:07Feb2017)  Requested for: 11Aug2016; Last   Rx:22Urg6185 Ordered   2  Dicyclomine HCl - 10 MG Oral Capsule; TAKE 2 CAPSULES 4 TIMES DAILY; Therapy: 94Jwu9935 to (Renew:16Oct2014)  Requested for: 89Kgq0362; Last   Rx:89Dzp6260 Ordered   3  Fenofibrate 145 MG Oral Tablet; TAKE 1 TABLET DAILY; Therapy: 29QEC0227 to (Evaluate:12Apr2017)  Requested for: 14Oct2016; Last   Rx:02Vza7230 Ordered   4  Furosemide 20 MG Oral Tablet; TAKE 1 TABLET DAILY AS DIRECTED; Therapy: 82IZD0454 to (Evaluate:12Apr2017)  Requested for: 04YKZ8458; Last   Rx:05Qlj0043 Ordered   5  HydrOXYzine HCl - 50 MG Oral Tablet; TAKE 1 TABLET 3-4 TIMES DAILY; Therapy: 47LEO5399 to (Evaluate:21Jan2017)  Requested for: 13UEE1717; Last   Rx:99Vln0449 Ordered   6  LamoTRIgine 100 MG Oral Tablet; TAKE 1 TABLET DAILY; Therapy: 78ZEW7879 to Recorded   7  Lisinopril 10 MG Oral Tablet; TAKE 1 TABLET DAILY; Therapy: 01Apr2016 to (Evaluate:12Apr2017)  Requested for: 14Oct2016; Last   Rx:55Pzs6891 Ordered   8  Omeprazole 20 MG Oral Capsule Delayed Release; TAKE 1 CAPSULE DAILY; Therapy: 49HGF3980 to (Evaluate:12Apr2017)  Requested for: 14Oct2016; Last   Rx:55Dlt3868 Ordered   9  OxyCODONE HCl - 30 MG Oral Tablet; TAKE 1 TABLET EVERY 6 HOURS AS NEEDED   FOR PAIN;   Therapy: 37BBU6794 to (Evaluate:95Zmk2835); Last Rx:42Exk4030 Ordered   10  PARoxetine HCl - 20 MG Oral Tablet; TAKE 1 TABLET BY MOUTH DAILY   AT BEDTIME; Therapy: 78JFW8838 to (Evaluate:29Apr2016)  Requested for: 28DDN3475; Last    Rx:30Mar2016 Ordered   11  Simvastatin 20 MG Oral Tablet; TAKE TAKE ONE TABLET DAILY; Therapy: 57WSA1537 to (Evaluate:12Apr2017)  Requested for: 87ZCQ2617; Last    Rx:14Oct2016 Ordered   12  Ventolin  (90 Base) MCG/ACT Inhalation Aerosol Solution; INHALE 1 TO 2    PUFFS EVERY 4 TO 6 HOURS AS NEEDED;     Therapy: 63LDX9821 to (Evaluate:49Spc3629)  Requested for: 83Nku1966; Last    Rx:32Edn4037 Ordered    Allergies    1  Aspirin Buffered TABS   2  Codeine Sulfate TABS   3  Compazine TABS   4  Imitrex TABS   5  Morphine Sulfate TABS   6  Toradol Oral TABS   7   Vicodin TABS    Signatures   Electronically signed by : DAMIR Butler; Nov 22 2016  7:52AM EST                       (Author)    Electronically signed by : LAYLA Rudolph ; Nov 29 2016  1:09PM EST

## 2018-01-15 NOTE — RESULT NOTES
Verified Results  CT RENAL STONE STUDY ABDOMEN PELVIS WO CONTRAST [id number] 07:43PM Leti Bennett Order Number: [id number]   Performing Comments: STAT   - Patient Instructions: To schedule this appointment, please contact Central Scheduling at [phone]  Test Name Result Flag Reference   CT RENAL STONE STUDY ABDOMEN PELVIS WO CONTRAST (Report)     CT ABDOMEN AND PELVIS WITHOUT IV CONTRAST - LOW DOSE RENAL STONE      INDICATION: Right flank pain      COMPARISON: March 17, 2011     TECHNIQUE: Low dose thin section CT examination of the abdomen and pelvis was performed without intravenous or oral contrast according to a protocol specifically designed to evaluate for urinary tract calculus  Reformatted images were created in axial,   sagittal, and coronal planes  Evaluation for pathology in the abdomen and pelvis that is unrelated to urinary tract calculi is limited  Radiation dose length product (DLP) for this visit: 98 73 mGy-cm   This examination, like all CT scans performed in the [facility], was performed utilizing techniques to minimize radiation dose exposure, including the use of iterative    reconstruction and automated exposure control  FINDINGS:     RIGHT KIDNEY AND URETER:   No urinary tract calculi  No hydronephrosis or hydroureter  No perinephric collection  LEFT KIDNEY AND URETER:   No urinary tract calculi  No hydronephrosis or hydroureter  No perinephric collection  URINARY BLADDER:   Unremarkable  No significant abnormality in the visualized lung bases  Limited low radiation dose noncontrast CT evaluation demonstrates no clinically significant abnormality of spleen, pancreas, or adrenal glands  Enlarged liver   Gallbladder has been removed  No bowel obstruction  No ascites or lymphadenopathy  Limited evaluation demonstrates no evidence to suggest acute appendicitis  No acute fracture or destructive osseous lesion is identified  IMPRESSION:   No CT evidence of renal colic  Unremarkable appendix            Workstation performed: [id number]     Signed by:    Hailey Rivas DO   7/31/17

## 2018-01-15 NOTE — RESULT NOTES
Verified Results  (1) OXYCODONE/OXYMORPHONE, URINE CONFIRMATION REFLEX [id number] 07:57PM Connie [place]     Test Name Result Flag Reference   OXYCODONE/OXYMORPHONE Positive A    Test includes Oxycodone and Oxymorphone   OXYCODONE Positive A    OXYCODONE (GC/MS) >25491 ng/mL  Mdghuj=763   OXYMORPHONE Positive A    OXYMORPHONE GC/MS 9546 ng/mL  Avopeq=891   Performed at:  [address] [place] [address]  [id number]  : Coral Blackman MD, Phone:  [phone]

## 2018-01-15 NOTE — RESULT NOTES
Verified Results  (1) DRUG ABUSE SCREEN, URINE ROUTINE 98Srl2366 03:32PM Dane León Order Number: [id number]     Test Name Result Flag Reference   AMPHETAMINE SCREEN URINE Negative ng/mL  Zqxnsk=1213   Amphetamine test includes Amphetamine and Methamphetamine  BARBITURATE SCREEN URINE Negative ng/mL  Tljwdy=908   BENZODIAZEPINE SCREEN, URINE Negative ng/mL  Ddlyih=502   CANNABINOID SCREEN URINE Negative ng/mL  Cutoff=50   COCAINE(METAB  )SCREEN, URINE Negative ng/mL  Jhitwe=065   METHADONE SCREEN, URINE Negative ng/mL  Yahswo=565   OPIATE SCREEN URINE Negative  Acjcrq=871   Opiate test includes Codeine and Morphine only     PHENCYCLIDINE (PCP), QUAL, UR Negative ng/mL  Cutoff=25   PROPOXYPHENE, SCREEN Negative ng/mL  Avtlgk=236   Performed at:  [address], [address] [place] [address]  [id number]  : Coral Blackman MD, Phone:  [phone]

## 2018-01-16 NOTE — RESULT NOTES
Verified Results  (1) PRESLEY SCREEN W/REFLEX TO TITER/PATTERN [id number] 10:43AM Clifford Rowdy     Test Name Result Flag Reference   PRESLEY SCREEN   Negative  Negative

## 2018-01-16 NOTE — RESULT NOTES
Verified Results  * XR FOOT 3+ VIEW LEFT [id number] 10:16AM Ryan Naqvi Order Number: [id number]     Test Name Result Flag Reference   XR FOOT 3+ VW LEFT (Report)     LEFT FOOT     INDICATION: Left foot pain  COMPARISON: None     VIEWS: 3     IMAGES: 3     FINDINGS:     There is no acute fracture or dislocation  No degenerative changes  No lytic or blastic lesions are seen  Soft tissues are unremarkable  IMPRESSION:     No acute osseous abnormality  Workstation performed: [id number]     Signed by:    Ange Drake MD   11/20/17

## 2018-01-16 NOTE — RESULT NOTES
Verified Results  * XR SPINE LUMBAR MINIMUM 4 VIEWS NON INJURY [id number] 06:48PM Alejandro Aguilar Order Number: [id number]     Test Name Result Flag Reference   XR SPINE LUMBAR MINIMUM 4 VIEWS (Report)     LUMBAR SPINE     INDICATION: Lower back pain that radiates down the left leg  COMPARISON: 9/8/2008     VIEWS: AP, lateral, bilateral oblique and coned down projections     IMAGES: 5     FINDINGS:     Alignment is unremarkable  There is no radiographic evidence of acute fracture or destructive osseous lesion  The vertebral disc spaces are maintained  Minor facet arthrosis is seen on the right at L5-S1  The pedicles appear intact  No pars defects  Surgical clips right upper quadrant and right pelvis with calcified left pelvic phlebolith  IMPRESSION:     No acute findings  Minor facet arthrosis L5-S1 on the right         Workstation performed: [id number]     Signed by:   Trey Villareal DO   5/25/17

## 2018-01-16 NOTE — RESULT NOTES
Verified Results  (1) PRESLEY SCREEN W/REFLEX TO TITER/PATTERN 01Aug2016 10:18AM [facility] Number: [id number]     Test Name Result Flag Reference   PRESLEY SCREEN  Negative  Negative     (1) CBC/PLT/DIFF 01Aug2016 10:18AM [facility] Order Number: [id number]     Test Name Result Flag Reference   WBC COUNT 7 91 Thousand/uL  4 31-10 16   RBC COUNT 4 85 Million/uL  3 81-5 12   HEMOGLOBIN 15 0 g/dL  11 5-15 4   HEMATOCRIT 43 9 %  34 8-46  1   MCV 91 fL  82-98   MCH 30 9 pg  26 8-34 3   MCHC 34 2 g/dL  31 4-37 4   RDW 13 1 %  11 6-15 1   MPV 10 7 fL  8 9-12 7   PLATELET COUNT 146 Thousands/uL  149-390   nRBC AUTOMATED 0 /100 WBCs     NEUTROPHILS RELATIVE PERCENT 56 %  43-75   LYMPHOCYTES RELATIVE PERCENT 31 %  14-44   MONOCYTES RELATIVE PERCENT 9 %  4-12   EOSINOPHILS RELATIVE PERCENT 3 %  0-6   BASOPHILS RELATIVE PERCENT 1 %  0-1   NEUTROPHILS ABSOLUTE COUNT 4 53 Thousands/?L  1 85-7 62   LYMPHOCYTES ABSOLUTE COUNT 2 42 Thousands/?L  0 60-4 47   MONOCYTES ABSOLUTE COUNT 0 67 Thousand/?L  0 17-1 22   EOSINOPHILS ABSOLUTE COUNT 0 21 Thousand/?L  0 00-0 61   BASOPHILS ABSOLUTE COUNT 0 06 Thousands/?L  0 00-0 10     (1) C-REACTIVE PROTEIN 01Aug2016 10:18AM [facility] Order Number: [id number]     Test Name Result Flag Reference   C-REACT PROTEIN <3 0 mg/L  <3 0     (1) LYME ANTIBODY PROFILE W/REFLEX TO WESTERN BLOT 01Aug2016 10:18AM [facility] Order Number: [id number]     Test Name Result Flag Reference   LYME IGG 0 09  0 00-0 79   NEGATIVE(0 00-0 79)-Absence of detectable Borrelia IgG Antibodies  A negative result does not exclude the possibility of Borrelia infection  If early Lyme disease is suspected,a second sample should be collected & tested 4 weeks after initial testing  LYME IGM 0 76  0 00-0 79   NEGATIVE (0 00-0 79)-Absence of detectable Borrelia IgM antibodies  A negative result does not exclude the possibility of Borrelia infection   If early lyme disease is suspected, a second sample should be collected & tested 4 weeks after initial testing      (1) RHEUMATOID FACTOR SCREEN 01Aug2016 10:18AM Tari Sherman Order Number: [id number]     Test Name Result Flag Reference   RHEUMATOID FACTOR Negative  Negative     (1) SED RATE 01Aug2016 10:18AM Tari Sherman Order Number: [id number]     Test Name Result Flag Reference   SED RATE 13 mm/hour  0-20     (1) COMPREHENSIVE METABOLIC PANEL [id number] 44:56VP Tari Sherman Order Number: [id number]     Test Name Result Flag Reference   GLUCOSE,RANDM 98 mg/dL     If the patient is fasting, the ADA then defines impaired fasting glucose as > 100 mg/dL and diabetes as > or equal to 123 mg/dL  SODIUM 137 mmol/L  136-145   POTASSIUM 3 9 mmol/L  3 5-5 3   CHLORIDE 104 mmol/L  100-108   CARBON DIOXIDE 29 mmol/L  21-32   ANION GAP (CALC) 4 mmol/L  4-13   BLOOD UREA NITROGEN 11 mg/dL  5-25   CREATININE 0 70 mg/dL  0 60-1 30   Standardized to IDMS reference method   CALCIUM 8 9 mg/dL  8 3-10 1   BILI, TOTAL 0 28 mg/dL  0 20-1 00   ALK PHOSPHATAS 117 U/L H    ALT (SGPT) 23 U/L  12-78   AST(SGOT) 17 U/L  5-45   ALBUMIN 3 6 g/dL  3 5-5 0   TOTAL PROTEIN 7 0 g/dL  6 4-8 2   eGFR Non-African American      >60 0 ml/min/1 73sq [facility] Disease Education Program recommendations are as follows:  GFR calculation is accurate only with a steady state creatinine  Chronic Kidney disease less than 60 ml/min/1 73 sq  meters  Kidney failure less than 15 ml/min/1 73 sq  meters

## 2018-01-22 VITALS
HEART RATE: 78 BPM | WEIGHT: 122.13 LBS | RESPIRATION RATE: 18 BRPM | OXYGEN SATURATION: 99 % | BODY MASS INDEX: 22.48 KG/M2 | TEMPERATURE: 94.7 F | DIASTOLIC BLOOD PRESSURE: 64 MMHG | HEIGHT: 62 IN | SYSTOLIC BLOOD PRESSURE: 110 MMHG

## 2018-01-22 VITALS
TEMPERATURE: 94.9 F | SYSTOLIC BLOOD PRESSURE: 124 MMHG | HEART RATE: 98 BPM | HEIGHT: 62 IN | OXYGEN SATURATION: 99 % | WEIGHT: 118.13 LBS | BODY MASS INDEX: 21.74 KG/M2 | DIASTOLIC BLOOD PRESSURE: 84 MMHG | RESPIRATION RATE: 18 BRPM

## 2018-01-22 VITALS
HEIGHT: 62 IN | WEIGHT: 125.38 LBS | TEMPERATURE: 96.8 F | DIASTOLIC BLOOD PRESSURE: 72 MMHG | BODY MASS INDEX: 23.07 KG/M2 | SYSTOLIC BLOOD PRESSURE: 114 MMHG | HEART RATE: 80 BPM | RESPIRATION RATE: 18 BRPM

## 2018-01-25 ENCOUNTER — TELEPHONE (OUTPATIENT)
Dept: FAMILY MEDICINE CLINIC | Facility: CLINIC | Age: 53
End: 2018-01-25

## 2018-01-29 DIAGNOSIS — M25.512 CHRONIC LEFT SHOULDER PAIN: Primary | ICD-10-CM

## 2018-01-29 DIAGNOSIS — G89.4 CHRONIC PAIN SYNDROME: ICD-10-CM

## 2018-01-29 DIAGNOSIS — G89.29 CHRONIC LEFT SHOULDER PAIN: Primary | ICD-10-CM

## 2018-01-29 RX ORDER — OXYCODONE HYDROCHLORIDE 30 MG/1
30 TABLET ORAL EVERY 6 HOURS PRN
Qty: 120 TABLET | Refills: 0 | Status: SHIPPED | OUTPATIENT
Start: 2018-01-29 | End: 2018-02-23 | Stop reason: SDUPTHER

## 2018-01-29 RX ORDER — OXYCODONE HYDROCHLORIDE 30 MG/1
30 TABLET, FILM COATED, EXTENDED RELEASE ORAL 4 TIMES DAILY
Refills: 0 | Status: CANCELLED | OUTPATIENT
Start: 2018-01-29

## 2018-01-31 ENCOUNTER — TELEPHONE (OUTPATIENT)
Dept: FAMILY MEDICINE CLINIC | Facility: CLINIC | Age: 53
End: 2018-01-31

## 2018-01-31 DIAGNOSIS — R00.2 PALPITATIONS: Primary | ICD-10-CM

## 2018-01-31 PROBLEM — S12.9XXA COMPRESSION FRACTURE OF CERVICAL VERTEBRA (HCC): Status: ACTIVE | Noted: 2017-05-12

## 2018-01-31 PROBLEM — M54.12 CERVICAL RADICULOPATHY: Status: ACTIVE | Noted: 2017-05-12

## 2018-01-31 RX ORDER — FENOFIBRATE 145 MG/1
1 TABLET, COATED ORAL DAILY
COMMUNITY
Start: 2016-02-02

## 2018-01-31 RX ORDER — ALBUTEROL SULFATE 90 UG/1
AEROSOL, METERED RESPIRATORY (INHALATION)
COMMUNITY
Start: 2016-04-18 | End: 2018-07-02 | Stop reason: SDUPTHER

## 2018-01-31 RX ORDER — HYDROXYZINE 50 MG/1
1 TABLET, FILM COATED ORAL
COMMUNITY
Start: 2014-02-07 | End: 2018-07-30 | Stop reason: SDUPTHER

## 2018-01-31 RX ORDER — LAMOTRIGINE 100 MG/1
TABLET ORAL
Refills: 5 | COMMUNITY
Start: 2018-01-13

## 2018-01-31 RX ORDER — PAROXETINE HYDROCHLORIDE 20 MG/1
TABLET, FILM COATED ORAL
COMMUNITY
Start: 2016-02-02 | End: 2018-07-30 | Stop reason: SDUPTHER

## 2018-01-31 RX ORDER — MONTELUKAST SODIUM 10 MG/1
1 TABLET ORAL DAILY
COMMUNITY
Start: 2017-09-15

## 2018-01-31 RX ORDER — LISINOPRIL 10 MG/1
1 TABLET ORAL DAILY
COMMUNITY
Start: 2016-04-01

## 2018-02-20 ENCOUNTER — HOSPITAL ENCOUNTER (OUTPATIENT)
Dept: NON INVASIVE DIAGNOSTICS | Facility: CLINIC | Age: 53
Discharge: HOME | End: 2018-02-20
Payer: COMMERCIAL

## 2018-02-20 DIAGNOSIS — R00.2 PALPITATIONS: ICD-10-CM

## 2018-02-20 PROCEDURE — 93226 XTRNL ECG REC<48 HR SCAN A/R: CPT

## 2018-02-20 PROCEDURE — 93225 XTRNL ECG REC<48 HRS REC: CPT

## 2018-02-23 ENCOUNTER — TELEPHONE (OUTPATIENT)
Dept: FAMILY MEDICINE CLINIC | Facility: CLINIC | Age: 53
End: 2018-02-23

## 2018-02-23 ENCOUNTER — OFFICE VISIT (OUTPATIENT)
Dept: FAMILY MEDICINE CLINIC | Facility: CLINIC | Age: 53
End: 2018-02-23
Payer: COMMERCIAL

## 2018-02-23 VITALS
HEIGHT: 62 IN | HEART RATE: 80 BPM | DIASTOLIC BLOOD PRESSURE: 70 MMHG | BODY MASS INDEX: 22.87 KG/M2 | OXYGEN SATURATION: 99 % | RESPIRATION RATE: 18 BRPM | WEIGHT: 124.3 LBS | SYSTOLIC BLOOD PRESSURE: 108 MMHG | TEMPERATURE: 94.2 F

## 2018-02-23 DIAGNOSIS — G89.4 CHRONIC PAIN SYNDROME: ICD-10-CM

## 2018-02-23 DIAGNOSIS — R00.2 PALPITATIONS: ICD-10-CM

## 2018-02-23 DIAGNOSIS — N39.0 ACUTE URINARY TRACT INFECTION: Primary | ICD-10-CM

## 2018-02-23 DIAGNOSIS — S12.9XXD COMPRESSION FRACTURE OF CERVICAL SPINE WITH ROUTINE HEALING, SUBSEQUENT ENCOUNTER: ICD-10-CM

## 2018-02-23 DIAGNOSIS — M54.12 CERVICAL RADICULOPATHY: ICD-10-CM

## 2018-02-23 LAB
BILIRUB UR QL STRIP: NEGATIVE
CLARITY UR: NORMAL
COLOR UR: NORMAL
GLUCOSE UR STRIP-MCNC: NEGATIVE MG/DL
HGB UR QL STRIP.AUTO: NEGATIVE
KETONES UR STRIP-MCNC: NEGATIVE MG/DL
LEUKOCYTE ESTERASE UR QL STRIP: NEGATIVE
NITRITE UR QL STRIP: NEGATIVE
PH UR STRIP.AUTO: 6 [PH] (ref 4.5–8)
PROT UR STRIP-MCNC: NEGATIVE MG/DL
SL AMB  POCT GLUCOSE, UA: NORMAL
SL AMB LEUKOCYTE ESTERASE,UA: NORMAL
SL AMB POCT BILIRUBIN,UA: NORMAL
SL AMB POCT BLOOD,UA: NORMAL
SL AMB POCT CLARITY,UA: CLEAR
SL AMB POCT COLOR,UA: YELLOW
SL AMB POCT KETONES,UA: NORMAL
SL AMB POCT NITRITE,UA: NORMAL
SL AMB POCT PH,UA: 6
SL AMB POCT SPECIFIC GRAVITY,UA: 1.02
SL AMB POCT URINE PROTEIN: NORMAL
SL AMB POCT UROBILINOGEN: 0.2
SP GR UR STRIP.AUTO: 1.02 (ref 1–1.03)
UROBILINOGEN UR QL STRIP.AUTO: 0.2 E.U./DL

## 2018-02-23 PROCEDURE — 81003 URINALYSIS AUTO W/O SCOPE: CPT | Performed by: PHYSICIAN ASSISTANT

## 2018-02-23 PROCEDURE — 93227 XTRNL ECG REC<48 HR R&I: CPT | Performed by: INTERNAL MEDICINE

## 2018-02-23 PROCEDURE — 81002 URINALYSIS NONAUTO W/O SCOPE: CPT | Performed by: PHYSICIAN ASSISTANT

## 2018-02-23 PROCEDURE — 99213 OFFICE O/P EST LOW 20 MIN: CPT | Performed by: PHYSICIAN ASSISTANT

## 2018-02-23 RX ORDER — SULFAMETHOXAZOLE AND TRIMETHOPRIM 800; 160 MG/1; MG/1
1 TABLET ORAL EVERY 12 HOURS SCHEDULED
Qty: 6 TABLET | Refills: 0 | Status: SHIPPED | OUTPATIENT
Start: 2018-02-23 | End: 2018-02-26

## 2018-02-23 RX ORDER — OXYCODONE HYDROCHLORIDE 30 MG/1
30 TABLET ORAL EVERY 6 HOURS PRN
Qty: 120 TABLET | Refills: 0 | Status: SHIPPED | OUTPATIENT
Start: 2018-02-23 | End: 2018-03-16 | Stop reason: SDUPTHER

## 2018-02-23 NOTE — PROGRESS NOTES
Assessment/Plan: For palpitations will wait upon results of Holter monitor determine next step evaluation  Believe that symptoms are related to patient's anxiety  For patient's chronic pain, it is currently stable  Refilled oxycodone  [place] drug [place], no concerns at this time  Last urine drug screening was completed on 09/2017  Since urine dip did show trace amount of bloods and due to patient's current symptoms will treat for urinary tract infection  Send over antibiotic to pharmacy  Make sure to drink plenty of fluids  If there is no improvement, or symptoms get worse make follow-up appointment  Diagnoses and all orders for this visit:    Acute urinary tract infection  -     sulfamethoxazole-trimethoprim (BACTRIM DS) 800-160 mg per tablet; Take 1 tablet by mouth every 12 (twelve) hours for 3 days  -     POCT urine dip auto non-scope  -     UA (URINE) with reflex to Microscopic    Palpitations    Cervical radiculopathy    Compression fracture of cervical spine with routine healing, subsequent encounter    Chronic pain syndrome  -     oxyCODONE (ROXICODONE) 30 MG immediate release tablet; Take 1 tablet (30 mg total) by mouth every 6 (six) hours as needed for moderate pain Max Daily Amount: 120 mg          Subjective:      Patient ID: Beatriz Pastor is a 47 y o  female  44-year-old female presenting with concerns of urinary tract infection  Patient states over the last 3 days she has had increased urinary frequency and urgency  States twice she almost did not make it to use the restroom  Has been having suprapubic pain  This morning woke up with right lower back pain  Denies any hematuria, nausea, vomiting, fevers  Patient states that she had Holter monitor done 3 days ago  Believe she had 1 episode palpitations that occurred with Holter monitor  States the day afterwards she did have an episode that she was experiencing chest pain and shortness of breath    Currently denies any symptoms  Patient is continuing to take oxycodone for her chronic neck and back pain  Denies any changes in symptoms  Continues to have radicular pain to left shoulder  Denies any decreased range of motion  Medication has been helping manage her pain symptoms  The following portions of the patient's history were reviewed and updated as appropriate:   She  has a past medical history of Anxiety; Cyst of joint of shoulder; Depression; Gastritis; Gastroparesis; H/O ovarian cystectomy; Irritable bowel; Migraine; Seizures ([facility] ); and TMJ disease  She   Patient Active Problem List    Diagnosis Date Noted    Palpitations 01/31/2018    Cervical radiculopathy 05/12/2017    Compression fracture of cervical vertebra (HCC) 05/12/2017    Chronic GERD 04/18/2016    Hyperlipidemia 07/02/2014    Chronic left shoulder pain 12/13/2013    Anxiety disorder 11/29/2012    Vitamin D deficiency 11/29/2012    Seizure, epileptic ([facility] ) 11/16/2012    Benign essential hypertension 01/09/2012     Her family history includes Alcohol abuse in her father; Coronary artery disease in her father and mother; Depression in her mother; Diabetes in her mother; Hypertension in her father; Kidney failure in her father; Stomach cancer in her mother  She  reports that she has been smoking  She has never used smokeless tobacco  She reports that she does not drink alcohol or use drugs  Current Outpatient Prescriptions   Medication Sig Dispense Refill    albuterol (VENTOLIN HFA) 90 mcg/act inhaler Inhale      fenofibrate (TRICOR) 145 mg tablet Take 1 tablet by mouth daily      fluticasone-salmeterol (ADVAIR DISKUS) 500-50 mcg/dose Inhale 1 puff 2 (two) times a day      hydrOXYzine HCL (ATARAX) 50 mg tablet Take 1 tablet by mouth      lamoTRIgine (LaMICtal) 100 mg tablet INDICATIONS  EPILEPSY  TAKE 2 TABLETS  BY MOUTH IN THE MORNING AND 2 & 1/2 TABLETS AT NIGHT    5    lisinopril (ZESTRIL) 10 mg tablet Take 1 tablet by mouth daily      methocarbamol (ROBAXIN) 750 mg tablet Take 1 tablet by mouth 3 (three) times a day as needed for muscle spasms 42 tablet 0    montelukast (SINGULAIR) 10 mg tablet Take 1 tablet by mouth daily      omeprazole (PriLOSEC) 20 mg delayed release capsule Take 20 mg by mouth daily   oxyCODONE (ROXICODONE) 30 MG immediate release tablet Take 1 tablet (30 mg total) by mouth every 6 (six) hours as needed for moderate pain Max Daily Amount: 120 mg 120 tablet 0    PARoxetine (PAXIL) 20 mg tablet Take by mouth      sulfamethoxazole-trimethoprim (BACTRIM DS) 800-160 mg per tablet Take 1 tablet by mouth every 12 (twelve) hours for 3 days 6 tablet 0     No current facility-administered medications for this visit  She is allergic to acetaminophen-codeine; aspirin; buprenorphine; compazine [prochlorperazine]; gabapentin; hydrocodone-acetaminophen; ibuprofen; imitrex [sumatriptan]; ketorolac; and morphine and related       Review of Systems   Constitutional: Negative for chills, fatigue and fever  HENT: Negative for congestion, ear pain, hearing loss, rhinorrhea and sore throat  Eyes: Negative for pain and visual disturbance  Respiratory: Positive for shortness of breath  Negative for cough and wheezing  Cardiovascular: Positive for chest pain and palpitations  Negative for leg swelling  Gastrointestinal: Positive for abdominal pain  Negative for blood in stool, constipation, diarrhea, nausea and vomiting  Endocrine: Negative for cold intolerance, heat intolerance and polyuria  Genitourinary: Positive for dysuria, frequency and urgency  Negative for difficulty urinating and hematuria  Musculoskeletal: Negative for arthralgias, joint swelling and myalgias  Skin: Negative for rash and wound  Neurological: Negative for dizziness, syncope, weakness, numbness and headaches  Psychiatric/Behavioral: Negative for dysphoric mood and sleep disturbance  The patient is nervous/anxious  Objective:      /70   Pulse 80   Temp (!) 94 2 °F (34 6 °C)   Resp 18   Ht 5' 2" (1 575 m)   Wt 56 4 kg (124 lb 4 8 oz)   SpO2 99%   BMI 22 73 kg/m²          Physical Exam   Constitutional: She is oriented to person, place, and time  She appears well-developed and well-nourished  HENT:   Right Ear: Hearing, tympanic membrane, external ear and ear canal normal    Left Ear: Hearing, tympanic membrane, external ear and ear canal normal    Nose: Nose normal    Mouth/Throat: Oropharynx is clear and moist and mucous membranes are normal  No oropharyngeal exudate  Eyes: Conjunctivae, EOM and lids are normal  Pupils are equal, round, and reactive to light  Neck: Normal range of motion  Neck supple  Cardiovascular: Normal rate, regular rhythm, normal heart sounds and normal pulses  Exam reveals no gallop and no friction rub  No murmur heard  Pulmonary/Chest: Effort normal and breath sounds normal  No respiratory distress  She has no wheezes  She has no rales  Abdominal: Soft  Normal appearance and bowel sounds are normal  She exhibits no distension  There is tenderness in the suprapubic area  There is no rigidity, no rebound, no guarding and no CVA tenderness  Musculoskeletal: She exhibits no edema  Left shoulder: She exhibits decreased range of motion, tenderness and bony tenderness  She exhibits normal strength  Cervical back: She exhibits tenderness, bony tenderness and spasm  Lumbar back: She exhibits tenderness and spasm  Lymphadenopathy:     She has no cervical adenopathy  Neurological: She is alert and oriented to person, place, and time  She has normal reflexes  No cranial nerve deficit  Skin: Skin is warm and dry  No rash noted  Psychiatric: She has a normal mood and affect  Her behavior is normal  Thought content normal    Nursing note and vitals reviewed

## 2018-02-25 NOTE — ASSESSMENT & PLAN NOTE
Pain is stable at this time  Refilled oxycodone  Reviewed [place] drug database since come, no concerns at this time  Last urine drug screen was completed 09/2017

## 2018-02-25 NOTE — ASSESSMENT & PLAN NOTE
Awaiting results of holter monitor to determine next step of evaluation  Believe symptoms may be related to anxiety

## 2018-02-26 NOTE — MISCELLANEOUS
Provider Comments  Provider Comments:   Patient no showed her 620PM appointment today 1/11/18      Signatures   Electronically signed by : DAMIR Howell; Jan 11 2018  7:14PM EST                       (Author)

## 2018-03-08 ENCOUNTER — TELEPHONE (OUTPATIENT)
Dept: FAMILY MEDICINE CLINIC | Facility: CLINIC | Age: 53
End: 2018-03-08

## 2018-03-08 DIAGNOSIS — M54.12 CERVICAL RADICULOPATHY: Primary | ICD-10-CM

## 2018-03-08 DIAGNOSIS — M25.512 CHRONIC LEFT SHOULDER PAIN: ICD-10-CM

## 2018-03-08 DIAGNOSIS — S12.9XXD COMPRESSION FRACTURE OF CERVICAL SPINE WITH ROUTINE HEALING, SUBSEQUENT ENCOUNTER: ICD-10-CM

## 2018-03-08 DIAGNOSIS — G89.29 CHRONIC LEFT SHOULDER PAIN: ICD-10-CM

## 2018-03-13 ENCOUNTER — HOSPITAL ENCOUNTER (EMERGENCY)
Facility: HOSPITAL | Age: 53
Discharge: HOME | End: 2018-03-13
Attending: EMERGENCY MEDICINE | Admitting: EMERGENCY MEDICINE
Payer: COMMERCIAL

## 2018-03-13 ENCOUNTER — APPOINTMENT (EMERGENCY)
Dept: CT IMAGING | Facility: HOSPITAL | Age: 53
End: 2018-03-13
Payer: COMMERCIAL

## 2018-03-13 VITALS
TEMPERATURE: 97.5 F | DIASTOLIC BLOOD PRESSURE: 74 MMHG | BODY MASS INDEX: 22.45 KG/M2 | SYSTOLIC BLOOD PRESSURE: 162 MMHG | HEART RATE: 60 BPM | OXYGEN SATURATION: 98 % | HEIGHT: 62 IN | WEIGHT: 122 LBS | RESPIRATION RATE: 17 BRPM

## 2018-03-13 DIAGNOSIS — R10.9 RIGHT FLANK PAIN: ICD-10-CM

## 2018-03-13 DIAGNOSIS — R39.15 URINARY URGENCY: ICD-10-CM

## 2018-03-13 DIAGNOSIS — R30.0 DYSURIA: Primary | ICD-10-CM

## 2018-03-13 LAB
ALBUMIN SERPL BCP-MCNC: 4.2 G/DL (ref 3.5–5)
ALP SERPL-CCNC: 136 U/L (ref 46–116)
ALT SERPL W P-5'-P-CCNC: 31 U/L (ref 12–78)
ANION GAP SERPL CALCULATED.3IONS-SCNC: 11 MMOL/L (ref 4–13)
AST SERPL W P-5'-P-CCNC: 32 U/L (ref 5–45)
BASOPHILS # BLD AUTO: 0.05 THOUSANDS/ΜL (ref 0–0.1)
BASOPHILS NFR BLD AUTO: 1 % (ref 0–1)
BILIRUB SERPL-MCNC: 0.16 MG/DL (ref 0.2–1)
BILIRUB UR QL STRIP: NEGATIVE
BUN SERPL-MCNC: 14 MG/DL (ref 5–25)
CALCIUM SERPL-MCNC: 9.3 MG/DL (ref 8.3–10.1)
CHLORIDE SERPL-SCNC: 103 MMOL/L (ref 100–108)
CLARITY UR: CLEAR
CO2 SERPL-SCNC: 27 MMOL/L (ref 21–32)
COLOR UR: YELLOW
COLOR, POC: CLEAR
CREAT SERPL-MCNC: 0.76 MG/DL (ref 0.6–1.3)
EOSINOPHIL # BLD AUTO: 0.13 THOUSAND/ΜL (ref 0–0.61)
EOSINOPHIL NFR BLD AUTO: 2 % (ref 0–6)
ERYTHROCYTE [DISTWIDTH] IN BLOOD BY AUTOMATED COUNT: 14.1 % (ref 11.6–15.1)
GFR SERPL CREATININE-BSD FRML MDRD: 90 ML/MIN/1.73SQ M
GLUCOSE SERPL-MCNC: 88 MG/DL (ref 65–140)
GLUCOSE UR STRIP-MCNC: NEGATIVE MG/DL
HCT VFR BLD AUTO: 37.9 % (ref 34.8–46.1)
HGB BLD-MCNC: 13.3 G/DL (ref 11.5–15.4)
HGB UR QL STRIP.AUTO: NEGATIVE
KETONES UR STRIP-MCNC: NEGATIVE MG/DL
LEUKOCYTE ESTERASE UR QL STRIP: NEGATIVE
LIPASE SERPL-CCNC: 209 U/L (ref 73–393)
LYMPHOCYTES # BLD AUTO: 2.06 THOUSANDS/ΜL (ref 0.6–4.47)
LYMPHOCYTES NFR BLD AUTO: 25 % (ref 14–44)
MCH RBC QN AUTO: 31.9 PG (ref 26.8–34.3)
MCHC RBC AUTO-ENTMCNC: 35.1 G/DL (ref 31.4–37.4)
MCV RBC AUTO: 91 FL (ref 82–98)
MONOCYTES # BLD AUTO: 0.44 THOUSAND/ΜL (ref 0.17–1.22)
MONOCYTES NFR BLD AUTO: 5 % (ref 4–12)
NEUTROPHILS # BLD AUTO: 5.42 THOUSANDS/ΜL (ref 1.85–7.62)
NEUTS SEG NFR BLD AUTO: 67 % (ref 43–75)
NITRITE UR QL STRIP: NEGATIVE
NRBC BLD AUTO-RTO: 0 /100 WBCS
PH UR STRIP.AUTO: 7.5 [PH] (ref 4.5–8)
PLATELET # BLD AUTO: 220 THOUSANDS/UL (ref 149–390)
PMV BLD AUTO: 10.1 FL (ref 8.9–12.7)
POTASSIUM SERPL-SCNC: 3.6 MMOL/L (ref 3.5–5.3)
PROT SERPL-MCNC: 7.5 G/DL (ref 6.4–8.2)
PROT UR STRIP-MCNC: NEGATIVE MG/DL
RBC # BLD AUTO: 4.17 MILLION/UL (ref 3.81–5.12)
SODIUM SERPL-SCNC: 141 MMOL/L (ref 136–145)
SP GR UR STRIP.AUTO: 1.02 (ref 1–1.03)
UROBILINOGEN UR QL STRIP.AUTO: 0.2 E.U./DL
WBC # BLD AUTO: 8.1 THOUSAND/UL (ref 4.31–10.16)

## 2018-03-13 PROCEDURE — 99284 EMERGENCY DEPT VISIT MOD MDM: CPT

## 2018-03-13 PROCEDURE — 96361 HYDRATE IV INFUSION ADD-ON: CPT

## 2018-03-13 PROCEDURE — 81002 URINALYSIS NONAUTO W/O SCOPE: CPT | Performed by: EMERGENCY MEDICINE

## 2018-03-13 PROCEDURE — 85025 COMPLETE CBC W/AUTO DIFF WBC: CPT | Performed by: EMERGENCY MEDICINE

## 2018-03-13 PROCEDURE — 96375 TX/PRO/DX INJ NEW DRUG ADDON: CPT

## 2018-03-13 PROCEDURE — 36415 COLL VENOUS BLD VENIPUNCTURE: CPT | Performed by: EMERGENCY MEDICINE

## 2018-03-13 PROCEDURE — 80053 COMPREHEN METABOLIC PANEL: CPT | Performed by: EMERGENCY MEDICINE

## 2018-03-13 PROCEDURE — 96374 THER/PROPH/DIAG INJ IV PUSH: CPT

## 2018-03-13 PROCEDURE — 74176 CT ABD & PELVIS W/O CONTRAST: CPT

## 2018-03-13 PROCEDURE — 81003 URINALYSIS AUTO W/O SCOPE: CPT

## 2018-03-13 PROCEDURE — 83690 ASSAY OF LIPASE: CPT | Performed by: EMERGENCY MEDICINE

## 2018-03-13 RX ORDER — ONDANSETRON 2 MG/ML
4 INJECTION INTRAMUSCULAR; INTRAVENOUS ONCE
Status: COMPLETED | OUTPATIENT
Start: 2018-03-13 | End: 2018-03-13

## 2018-03-13 RX ADMIN — HYDROMORPHONE HYDROCHLORIDE 0.5 MG: 1 INJECTION, SOLUTION INTRAMUSCULAR; INTRAVENOUS; SUBCUTANEOUS at 18:37

## 2018-03-13 RX ADMIN — SODIUM CHLORIDE 1000 ML: 0.9 INJECTION, SOLUTION INTRAVENOUS at 17:04

## 2018-03-13 RX ADMIN — ONDANSETRON 4 MG: 2 INJECTION INTRAMUSCULAR; INTRAVENOUS at 16:55

## 2018-03-13 NOTE — ED PROVIDER NOTES
History  Chief Complaint   Patient presents with    Urinary Urgency     pt to ER c/o urinary urgency since 2/23  Pt took course of antibiotics prescribed by [facility], but now has continued urgency and pain in RLQ and suprapubic area  48 y/o female, hx of kidney stones, IBS, chronic pain, and multiple abdominal surgeries, presents to the ED for urinary symptoms and abd pain  Patient reports that 2 weeks ago she developed urinary frequency and urgency, saw her PCP, was told that she had blood in her urine, and was given bactrim for a UTI  She states that symptoms have continued and this morning she woke up with suprapubic abd pain that has now migrated to the RLQ and right flank  She states that the pain is constant, waxes and wanes in severity and is sharp at times  She states that she has associated nausea  Denies vomiting, D/C, F/C, cp, sob, or vaginal symptoms  Has not needed procedures in the past for kidney stones, states symptoms are similar  Has tried oxycodone without any relief  No other complaints  Patient has had hysterectomy, cholecystectomy, and ovarian cystectomy  History provided by:  Patient  Abdominal Pain   Pain location:  Suprapubic and RLQ  Pain quality: sharp    Pain radiates to:  R flank  Pain severity:  Moderate  Onset quality:  Sudden  Duration:  1 day  Timing:  Constant  Progression:  Waxing and waning  Chronicity:  New  Context: previous surgery    Context: not sick contacts    Relieved by:  Nothing  Worsened by:  Nothing  Ineffective treatments: oxycodone  Associated symptoms: dysuria and nausea    Associated symptoms: no chest pain, no chills, no cough, no diarrhea, no fever, no hematuria, no shortness of breath, no sore throat and no vomiting    Risk factors: multiple surgeries        Prior to Admission Medications   Prescriptions Last Dose Informant Patient Reported? Taking?    PARoxetine (PAXIL) 20 mg tablet   Yes Yes   Sig: Take by mouth   albuterol (VENTOLIN HFA) 90 mcg/act inhaler   Yes Yes   Sig: Inhale   fenofibrate (TRICOR) 145 mg tablet   Yes Yes   Sig: Take 1 tablet by mouth daily   fluticasone-salmeterol (ADVAIR DISKUS) 500-50 mcg/dose   Yes Yes   Sig: Inhale 1 puff 2 (two) times a day   hydrOXYzine HCL (ATARAX) 50 mg tablet   Yes Yes   Sig: Take 1 tablet by mouth   lamoTRIgine (LaMICtal) 100 mg tablet   Yes Yes   Sig: INDICATIONS  EPILEPSY  TAKE 2 TABLETS  BY MOUTH IN THE MORNING AND 2 & 1/2 TABLETS AT NIGHT  lisinopril (ZESTRIL) 10 mg tablet   Yes Yes   Sig: Take 1 tablet by mouth daily   montelukast (SINGULAIR) 10 mg tablet   Yes Yes   Sig: Take 1 tablet by mouth daily   omeprazole (PriLOSEC) 20 mg delayed release capsule   Yes Yes   Sig: Take 20 mg by mouth daily  oxyCODONE (ROXICODONE) 30 MG immediate release tablet   No Yes   Sig: Take 1 tablet (30 mg total) by mouth every 6 (six) hours as needed for moderate pain Max Daily Amount: 120 mg      Facility-Administered Medications: None       Past Medical History:   Diagnosis Date    Anxiety     Cyst of joint of shoulder     Depression     Gastritis     Gastroparesis     H/O ovarian cystectomy     Irritable bowel     Migraine     Seizures ([facility] [address] )     TMJ disease        Past Surgical History:   Procedure Laterality Date    CHOLECYSTECTOMY      FOOT SURGERY      HYSTERECTOMY      MANDIBLE SURGERY      OVARIAN CYST REMOVAL      ROTATOR CUFF REPAIR      TEMPOROMANDIBULAR JOINT SURGERY      Condylotomy of TMJ     TUBAL LIGATION      WRIST FRACTURE SURGERY         Family History   Problem Relation Age of Onset    Coronary artery disease Mother     Diabetes Mother     Depression Mother     Stomach cancer Mother     Coronary artery disease Father     Alcohol abuse Father     Hypertension Father     Kidney failure Father      I have reviewed and agree with the history as documented      Social History   Substance Use Topics    Smoking status: Current Every Day Smoker     Packs/day: 0 50     Types: Cigarettes    Smokeless tobacco: Never Used    Alcohol use No        Review of Systems   Constitutional: Negative for chills and fever  HENT: Negative for congestion, ear pain and sore throat  Eyes: Negative for pain and visual disturbance  Respiratory: Negative for cough, shortness of breath and wheezing  Cardiovascular: Negative for chest pain and leg swelling  Gastrointestinal: Positive for abdominal pain and nausea  Negative for diarrhea and vomiting  Genitourinary: Positive for dysuria, frequency and urgency  Negative for hematuria  Musculoskeletal: Negative for neck pain and neck stiffness  Skin: Negative for rash and wound  Neurological: Negative for weakness, numbness and headaches  Psychiatric/Behavioral: Negative for agitation and confusion  All other systems reviewed and are negative  Physical Exam  ED Triage Vitals [03/13/18 1535]   Temperature Pulse Respirations Blood Pressure SpO2   97 5 °F (36 4 °C) 71 16 135/82 100 %      Temp Source Heart Rate Source Patient Position - Orthostatic VS BP Location FiO2 (%)   Oral Monitor Sitting Right arm --      Pain Score       9           Orthostatic Vital Signs  Vitals:    03/13/18 1715 03/13/18 1845 03/13/18 1906 03/13/18 2025   BP:  150/88 129/80 162/74   Pulse: 58 56 62 60   Patient Position - Orthostatic VS:   Lying        Physical Exam   Constitutional: She is oriented to person, place, and time  She appears well-developed and well-nourished  HENT:   Head: Normocephalic and atraumatic  Eyes: EOM are normal  Pupils are equal, round, and reactive to light  Neck: Normal range of motion  Neck supple  Cardiovascular: Normal rate and regular rhythm  Pulmonary/Chest: Effort normal and breath sounds normal  No respiratory distress  She has no wheezes  She has no rales  Abdominal: Soft  Bowel sounds are normal  She exhibits no distension  There is tenderness in the right lower quadrant and suprapubic area   There is CVA tenderness  There is no rigidity, no rebound, no guarding and negative Anand's sign  Musculoskeletal: Normal range of motion  Neurological: She is alert and oriented to person, place, and time  No focal deficits   Skin: Skin is warm and dry  Nursing note and vitals reviewed  ED Medications  Medications   sodium chloride 0 9 % bolus 1,000 mL (0 mL Intravenous Stopped 3/13/18 1816)   ondansetron (ZOFRAN) injection 4 mg (4 mg Intravenous Given 3/13/18 1655)   HYDROmorphone (DILAUDID) injection 0 5 mg (0 5 mg Intravenous Given 3/13/18 1837)       Diagnostic Studies  Results Reviewed     Procedure Component Value Units Date/Time    Comprehensive metabolic panel [[id number]]  (Abnormal) Collected:  03/13/18 1653    Lab Status:  Final result Specimen:  Blood from Arm, Right Updated:  03/13/18 1716     Sodium 141 mmol/L      Potassium 3 6 mmol/L      Chloride 103 mmol/L      CO2 27 mmol/L      Anion Gap 11 mmol/L      BUN 14 mg/dL      Creatinine 0 76 mg/dL      Glucose 88 mg/dL      Calcium 9 3 mg/dL      AST 32 U/L      ALT 31 U/L      Alkaline Phosphatase 136 (H) U/L      Total Protein 7 5 g/dL      Albumin 4 2 g/dL      Total Bilirubin 0 16 (L) mg/dL      eGFR 90 ml/min/1 73sq m     Narrative:         National Kidney Disease Education Program recommendations are as follows:  GFR calculation is accurate only with a steady state creatinine  Chronic Kidney disease less than 60 ml/min/1 73 sq  meters  Kidney failure less than 15 ml/min/1 73 sq  meters      Lipase [[id number]]  (Normal) Collected:  03/13/18 1653    Lab Status:  Final result Specimen:  Blood from Arm, Right Updated:  03/13/18 1716     Lipase 209 u/L     CBC and differential [[id number]]  (Normal) Collected:  03/13/18 1653    Lab Status:  Final result Specimen:  Blood from Arm, Right Updated:  03/13/18 1705     WBC 8 10 Thousand/uL      RBC 4 17 Million/uL      Hemoglobin 13 3 g/dL      Hematocrit 37 9 %      MCV 91 fL      MCH 31 9 pg      MCHC 35 1 g/dL      RDW 14 1 %      MPV 10 1 fL      Platelets 368 Thousands/uL      nRBC 0 /100 WBCs      Neutrophils Relative 67 %      Lymphocytes Relative 25 %      Monocytes Relative 5 %      Eosinophils Relative 2 %      Basophils Relative 1 %      Neutrophils Absolute 5 42 Thousands/µL      Lymphocytes Absolute 2 06 Thousands/µL      Monocytes Absolute 0 44 Thousand/µL      Eosinophils Absolute 0 13 Thousand/µL      Basophils Absolute 0 05 Thousands/µL     POCT urinalysis dipstick [[id number]]  (Normal) Resulted:  03/13/18 1704    Lab Status:  Final result Specimen:  Urine Updated:  03/13/18 1704     Color, UA clear    ED Urine Macroscopic [[id number]]  (Normal) Collected:  03/13/18 1700    Lab Status:  Final result Specimen:  Urine Updated:  03/13/18 1702     Color, UA Yellow     Clarity, UA Clear     pH, UA 7 5     Leukocytes, UA Negative     Nitrite, UA Negative     Protein, UA Negative mg/dl      Glucose, UA Negative mg/dl      Ketones, UA Negative mg/dl      Urobilinogen, UA 0 2 E U /dl      Bilirubin, UA Negative     Blood, UA Negative     Specific Gravity, UA 1 020    Narrative:       CLIN[facility] RESULT                 CT abdomen pelvis wo contrast   Final Result by Azeb Flores MD (03/13 1925)      Unremarkable unenhanced CT of the abdomen and pelvis  Workstation performed: [id number]               Procedures  Procedures      Phone Consults  ED Phone Contact    ED Course  ED Course                                MDM  Number of Diagnoses or Management Options  Dysuria: new and requires workup  Right flank pain: new and requires workup  Urinary urgency: new and requires workup  Diagnosis management comments: Patient with abd pain and urinary symptoms  Will get labs, ua, and ct abp/pelvis to r/o kidney stone vs appy     Patient reevaluated and feels improved  Patient updated on results of tests  Discharge instructions given including follow-up, and return precautions   Patient demonstrates verbal understanding and agrees with plan  Amount and/or Complexity of Data Reviewed  Clinical lab tests: ordered and reviewed  Tests in the radiology section of CPT®: ordered and reviewed  Tests in the medicine section of CPT®: ordered and reviewed  Discussion of test results with the performing providers: yes  Decide to obtain previous medical records or to obtain history from someone other than the patient: yes  Obtain history from someone other than the patient: yes  Review and summarize past medical records: yes  Discuss the patient with other providers: yes  Independent visualization of images, tracings, or specimens: yes    Patient Progress  Patient progress: improved    CritCare Time    Disposition  Final diagnoses:   Dysuria   Urinary urgency   Right flank pain     Time reflects when diagnosis was documented in both MDM as applicable and the Disposition within this note     Time User Action Codes Description Comment    3/13/2018  8:17 PM Swetha Saha A Add [R39 15] Urinary urgency     3/13/2018  8:17 PM Blaise, [facility] [R39 15] Urinary urgency     3/13/2018  8:17 PM Eladio Rued, Marylee Cheek A Add [R30 0] Dysuria     3/13/2018  8:17 PM Blaise, [address] [R10 9] Right flank pain     3/13/2018  8:17 PM Blaise, [address] [facility] [R10 9] Right flank pain     3/13/2018  8:17 PM Blaise, [address] [R39 15] Urinary urgency     3/13/2018  8:18 PM Ptakowski, Marylee Cheek A Add [R10 9] Right flank pain       ED Disposition     ED Disposition Condition Comment    Discharge  [facility] discharge to home/self care  Condition at discharge: Good        Follow-up Information     Follow up With Specialties Details Why Contact Info Additional Alea Samuels MD Urology Call in 1 day For follow-up as soon as possible   [address]       Roxie Lerma PA-C Family Medicine, Physician Assistant Call in 1 day For follow-up within 2-3 days  [address] [facility] [phone] [facility] Emergency Medicine Go to Immediately for any new or worsening symptoms  Omar Hawkins  [address] [facility] ED, [address] [facility] , [place], [place], [address]        Discharge Medication List as of 3/13/2018  8:19 PM      CONTINUE these medications which have NOT CHANGED    Details   albuterol (VENTOLIN HFA) 90 mcg/act inhaler Inhale, Starting Mon 4/18/2016, Historical Med      fenofibrate (TRICOR) 145 mg tablet Take 1 tablet by mouth daily, Starting Tue 2/2/2016, Historical Med      fluticasone-salmeterol (ADVAIR DISKUS) 500-50 mcg/dose Inhale 1 puff 2 (two) times a day, Starting Wed 8/27/2014, Historical Med      hydrOXYzine HCL (ATARAX) 50 mg tablet Take 1 tablet by mouth, Starting Fri 2/7/2014, Historical Med      lamoTRIgine (LaMICtal) 100 mg tablet INDICATIONS  EPILEPSY  TAKE 2 TABLETS  BY MOUTH IN THE MORNING AND 2 & 1/2 TABLETS AT NIGHT , Historical Med      lisinopril (ZESTRIL) 10 mg tablet Take 1 tablet by mouth daily, Starting Fri 4/1/2016, Historical Med      montelukast (SINGULAIR) 10 mg tablet Take 1 tablet by mouth daily, Starting Fri 9/15/2017, Historical Med      omeprazole (PriLOSEC) 20 mg delayed release capsule Take 20 mg by mouth daily  , Until Discontinued, Historical Med      oxyCODONE (ROXICODONE) 30 MG immediate release tablet Take 1 tablet (30 mg total) by mouth every 6 (six) hours as needed for moderate pain Max Daily Amount: 120 mg, Starting Fri 2/23/2018, Print      PARoxetine (PAXIL) 20 mg tablet Take by mouth, Starting Tue 2/2/2016, Historical Med           No discharge procedures on file  ED Provider  Attending physically available and evaluated [facility]  I managed the patient along with the ED Attending      Electronically Signed by         Drew Rivera DO  03/14/18 0006

## 2018-03-13 NOTE — ED NOTES
Pt states only pain medicine that works for me starts with a "D" but I forget what its called      Kristin Conley RN  03/13/18 5816

## 2018-03-14 NOTE — DISCHARGE INSTRUCTIONS
Dysuria   WHAT YOU NEED TO KNOW:   Dysuria is difficulty urinating, or pain, burning, or discomfort with urination  Dysuria is usually a symptom of another problem  DISCHARGE INSTRUCTIONS:   Return to the emergency department if:   · You have severe back, side, or abdominal pain  · You have fever and shaking chills  · You vomit several times in a row  Contact your healthcare provider if:   · Your symptoms do not go away, even after treatment  · You have questions or concerns about your condition or care  Medicines:   · Medicines  may be given to help treat a bacterial infection or help decrease bladder spasms  · Take your medicine as directed  Contact your healthcare provider if you think your medicine is not helping or if you have side effects  Tell him of her if you are allergic to any medicine  Keep a list of the medicines, vitamins, and herbs you take  Include the amounts, and when and why you take them  Bring the list or the pill bottles to follow-up visits  Carry your medicine list with you in case of an emergency  Follow up with your healthcare provider as directed: Your healthcare provider may also refer you to a urologist or nephrologist to have additional testing  Write down your questions so you remember to ask them during your visits  Manage your dysuria:   · Drink more liquids  Liquids help flush out bacteria that may be causing an infection  Ask your healthcare provider how much liquid to drink each day and which liquids are best for you  · Take sitz baths as directed  Fill a bathtub with 4 to 6 inches of warm water  You may also use a sitz bath pan that fits over a toilet  Sit in the sitz bath for 20 minutes  Do this 2 to 3 times a day, or as directed  The warm water can help decrease pain and swelling  © 2017 [facility] Information is for End User's use only and may not be sold, redistributed or otherwise used for commercial purposes   All illustrations and images included in CareNotes® are the copyrighted property of A D A M , Inc  or Dick Parikh  The above information is an  only  It is not intended as medical advice for individual conditions or treatments  Talk to your doctor, nurse or pharmacist before following any medical regimen to see if it is safe and effective for you  Urinary Urgency and Frequency   AMBULATORY CARE:   Urinary urgency and frequency  is a condition that increases how strongly or how often you need to urinate  The condition may also be called urgency-frequency syndrome  Urinary urgency means you feel such a strong need to urinate that you have trouble waiting  You may also feel discomfort in your bladder  Urinary frequency means you need to urinate many times during the day  This may also be called increased daytime frequency  You may be woken from sleep by the need to urinate  Urgency and frequency often happen together, but you may only have one  Contact your healthcare provider if:   · Your urine is pink, or you notice blood in your urine  · You have pain with urination  · You continue to have symptoms even after you take your medicine  · You have new or worsening symptoms  · You have questions or concerns about your condition or care  Treatment  will depend on the type and cause of your urination problems  You may need any of the following:  · Medicines  may be given to relax your bladder and decrease urination  You may also need antibiotics if your symptoms are caused by a bacterial infection  · Sacral nerve stimulation  sends electrical signals to your sacral nerve through a small device implanted under your skin  Your sacral nerve controls your bladder, sphincter, and pelvic floor muscles  · Botox injections  into your bladder may help relax your bladder muscle to decrease urgency and frequency      · Surgery  may be done if all other treatments cannot help you control your bladder  Manage urinary urgency and frequency:   · Keep a record of your urination patterns for a few days  Write down the number of times you urinate over 24 hours, the amount, and if you have urine leakage  Record how strong the urge to urinate was each time  Your healthcare provider may also want you to record the type and amount of liquids you drink  · Train your bladder  Go to the bathroom at set times, such as every 2 hours, even if you do not feel the urge to go  You can also try to hold your urine when you feel the urge to go  For example, hold your urine for 5 minutes when you feel the urge to go  As that becomes easier, hold your urine for 10 minutes  Work up to every 3 or 4 hours to help control your bladder  · Limit liquids as directed  Limit liquids to decrease the amount you urinate  Ask how much liquid to drink each day and which liquids are best for you  You may need to avoid drinking liquids several hours before you go to sleep  Your healthcare provider may also recommend that you limit caffeine and alcohol  · Do Kegel exercises often  Kegel exercises help strengthen your pelvic muscles and improve bladder control  These muscles help you stop urinating  Squeeze these muscles tightly for 5 seconds like you are trying to stop the flow of urine  Then relax for 5 seconds  Gradually work up to squeezing for 10 seconds  Do 3 sets of 15 repetitions a day, or as directed  · Exercise regularly and maintain a healthy weight  Ask your healthcare provider how much you should weigh and about the best exercise plan for you  Extra weight puts pressure on your bladder and may make your symptoms worse  Ask your provider to help you create a safe weight loss plan if you are overweight  Follow up with your healthcare provider as directed: Your healthcare provider may refer you to a specialist to find the cause of your symptoms   Write down your questions so you remember to ask them during your visits  © 2017 [facility] Information is for End User's use only and may not be sold, redistributed or otherwise used for commercial purposes  All illustrations and images included in CareNotes® are the copyrighted property of [employer]  or [address]  The above information is an  only  It is not intended as medical advice for individual conditions or treatments  Talk to your doctor, nurse or pharmacist before following any medical regimen to see if it is safe and effective for you  Flank Pain   WHAT YOU NEED TO KNOW:   Flank pain is felt in the area below your ribcage and above your hip bones, often in the lower back  Your pain may be dull or so severe that you cannot get comfortable  The pain may stay in one area or radiate to another area  It may worsen and lighten in waves  Flank pain is often a sign of problems with your urinary tract, such as a kidney stone or infection  DISCHARGE INSTRUCTIONS:   Return to the emergency department if:   · You have a fever  · Your heart is fluttering or jumping  · You see blood in your urine  · Your pain radiates into your lower abdomen and genital area  · You have intense pain in your low back next to your spine  · You are much more tired than usual and have no desire to eat  · You have a headache and your muscles jerk  Contact your healthcare provider if:   · You have an upset stomach and are vomiting  · You have to urinate more often, and with urgency  · Your pain worsens or does not improve, and you cannot get comfortable  · You pass a stone when you urinate  · You have questions or concerns about your condition or care  Medicines: The following medicines may be ordered for you:  · Pain medicine  may help decrease or relieve your pain  Do not wait until the pain is severe before you take your medicine       · Antibiotics  may help treat a urinary tract infection caused by bacteria  · Take your medicine as directed  Contact your healthcare provider if you think your medicine is not helping or if you have side effects  Tell him of her if you are allergic to any medicine  Keep a list of the medicines, vitamins, and herbs you take  Include the amounts, and when and why you take them  Bring the list or the pill bottles to follow-up visits  Carry your medicine list with you in case of an emergency  Follow up with your healthcare provider in 1 to 2 days or as directed:  Write down your questions so you remember to ask them during your visits  © 2017 [address] Information is for End User's use only and may not be sold, redistributed or otherwise used for commercial purposes  All illustrations and images included in CareNotes® are the copyrighted property of A D A M , Inc  or Dick Parikh  The above information is an  only  It is not intended as medical advice for individual conditions or treatments  Talk to your doctor, nurse or pharmacist before following any medical regimen to see if it is safe and effective for you

## 2018-03-16 ENCOUNTER — OFFICE VISIT (OUTPATIENT)
Dept: FAMILY MEDICINE CLINIC | Facility: CLINIC | Age: 53
End: 2018-03-16
Payer: COMMERCIAL

## 2018-03-16 VITALS
HEART RATE: 74 BPM | DIASTOLIC BLOOD PRESSURE: 74 MMHG | RESPIRATION RATE: 18 BRPM | BODY MASS INDEX: 23.19 KG/M2 | HEIGHT: 62 IN | OXYGEN SATURATION: 98 % | SYSTOLIC BLOOD PRESSURE: 120 MMHG | TEMPERATURE: 96.9 F | WEIGHT: 126 LBS

## 2018-03-16 DIAGNOSIS — S12.9XXD COMPRESSION FRACTURE OF CERVICAL SPINE WITH ROUTINE HEALING, SUBSEQUENT ENCOUNTER: ICD-10-CM

## 2018-03-16 DIAGNOSIS — R30.0 DYSURIA: ICD-10-CM

## 2018-03-16 DIAGNOSIS — R35.0 INCREASED URINARY FREQUENCY: Primary | ICD-10-CM

## 2018-03-16 DIAGNOSIS — G89.4 CHRONIC PAIN SYNDROME: ICD-10-CM

## 2018-03-16 DIAGNOSIS — G89.29 CHRONIC BILATERAL LOW BACK PAIN WITH RIGHT-SIDED SCIATICA: ICD-10-CM

## 2018-03-16 DIAGNOSIS — M25.551 RIGHT HIP PAIN: ICD-10-CM

## 2018-03-16 DIAGNOSIS — M54.41 CHRONIC BILATERAL LOW BACK PAIN WITH RIGHT-SIDED SCIATICA: ICD-10-CM

## 2018-03-16 PROCEDURE — 99214 OFFICE O/P EST MOD 30 MIN: CPT | Performed by: PHYSICIAN ASSISTANT

## 2018-03-16 RX ORDER — OXYCODONE HYDROCHLORIDE 30 MG/1
30 TABLET ORAL EVERY 6 HOURS PRN
Qty: 120 TABLET | Refills: 0 | Status: SHIPPED | OUTPATIENT
Start: 2018-03-16 | End: 2018-04-12 | Stop reason: SDUPTHER

## 2018-03-16 RX ORDER — PHENAZOPYRIDINE HYDROCHLORIDE 200 MG/1
200 TABLET, FILM COATED ORAL
Qty: 10 TABLET | Refills: 0 | Status: SHIPPED | OUTPATIENT
Start: 2018-03-16

## 2018-03-16 NOTE — PROGRESS NOTES
Assessment/Plan:    Chronic bilateral low back pain with right-sided sciatica  With concerns of increased urinary frequency and urgency, and also history of low back pain with right-sided sciatica, will order MRI for further evaluation of back pain and possible cause of urinary symptoms  Increased urinary frequency  Will refer to Urology for further evaluation  Compression fracture of cervical vertebra (HCC)  Pain stable at this time  Refilled oxycodone  [employer] drug database, no concerns at this time  Last urine drug screen completed 09/2017  Diagnoses and all orders for this visit:    Increased urinary frequency  -     MRI lumbar spine wo contrast; Future  -     Ambulatory referral to Urology; Future    Chronic bilateral low back pain with right-sided sciatica  -     MRI lumbar spine wo contrast; Future    Right hip pain  -     XR hip/pelv 2-3 vws right if performed; Future    Dysuria  -     Ambulatory referral to Urology; Future  -     phenazopyridine (PYRIDIUM) 200 mg tablet; Take 1 tablet (200 mg total) by mouth 3 (three) times a day with meals    Chronic pain syndrome  -     oxyCODONE (ROXICODONE) 30 MG immediate release tablet; Take 1 tablet (30 mg total) by mouth every 6 (six) hours as needed for moderate pain Max Daily Amount: 120 mg    Compression fracture of cervical spine with routine healing, subsequent encounter          Subjective:      Patient ID: Melanie Luke is a 47 y o  female  58-year-old female presenting for follow-up of recent hospital visit of right flank pain and dysuria  Patient was seen on 03/16/2018  Denies any changes in symptoms since being seen in the ED  Had UA and CT scan completed which came back normal   Current symptoms include right flank pain that radiates down to the groin  She also has increased urinary frequency with urgency, and also dysuria  At times has also noted a foul-smelling odor  Denies any hematuria    Patient does have a history of low back pain  She has had a previous hysterectomy  Denies any nausea, vomiting, diarrhea, constipation, blood in stool  Patient states that the oxycodone has not been helping with her pain  Patient needs refills of her oxycodone for her neck pain  Denies any changes in symptoms  Has been providing relief  Does notice increase in symptoms when she does heavy lifting  No other concerns today  The following portions of the patient's history were reviewed and updated as appropriate:   She  has a past medical history of Anxiety; Cyst of joint of shoulder; Depression; Gastritis; Gastroparesis; H/O ovarian cystectomy; Irritable bowel; Migraine; Seizures ([facility] [address] ); and TMJ disease  She   Patient Active Problem List    Diagnosis Date Noted    Chronic bilateral low back pain with right-sided sciatica 03/16/2018    Increased urinary frequency 03/16/2018    Palpitations 01/31/2018    Cervical radiculopathy 05/12/2017    Compression fracture of cervical vertebra (HCC) 05/12/2017    Chronic GERD 04/18/2016    Hyperlipidemia 07/02/2014    Chronic left shoulder pain 12/13/2013    Anxiety disorder 11/29/2012    Vitamin D deficiency 11/29/2012    Seizure, epileptic ([facility] [address] ) 11/16/2012    Benign essential hypertension 01/09/2012     She  has a past surgical history that includes Hysterectomy; Cholecystectomy; Mandible surgery; Rotator cuff repair; Foot surgery; Wrist fracture surgery; Temporomandibular joint surgery; Tubal ligation; and Ovarian cyst removal   Her family history includes Alcohol abuse in her father; Coronary artery disease in her father and mother; Depression in her mother; Diabetes in her mother; Hypertension in her father; Kidney failure in her father; Stomach cancer in her mother  She  reports that she has been smoking Cigarettes  She has been smoking about 0 50 packs per day  She has never used smokeless tobacco  She reports that she does not drink alcohol or use drugs    Current Outpatient Prescriptions   Medication Sig Dispense Refill    albuterol (VENTOLIN HFA) 90 mcg/act inhaler Inhale      fenofibrate (TRICOR) 145 mg tablet Take 1 tablet by mouth daily      fluticasone-salmeterol (ADVAIR DISKUS) 500-50 mcg/dose Inhale 1 puff 2 (two) times a day      hydrOXYzine HCL (ATARAX) 50 mg tablet Take 1 tablet by mouth      lamoTRIgine (LaMICtal) 100 mg tablet INDICATIONS  EPILEPSY  TAKE 2 TABLETS  BY MOUTH IN THE MORNING AND 2 & 1/2 TABLETS AT NIGHT  5    lisinopril (ZESTRIL) 10 mg tablet Take 1 tablet by mouth daily      montelukast (SINGULAIR) 10 mg tablet Take 1 tablet by mouth daily      omeprazole (PriLOSEC) 20 mg delayed release capsule Take 20 mg by mouth daily   oxyCODONE (ROXICODONE) 30 MG immediate release tablet Take 1 tablet (30 mg total) by mouth every 6 (six) hours as needed for moderate pain Max Daily Amount: 120 mg 120 tablet 0    PARoxetine (PAXIL) 20 mg tablet Take by mouth      phenazopyridine (PYRIDIUM) 200 mg tablet Take 1 tablet (200 mg total) by mouth 3 (three) times a day with meals 10 tablet 0     No current facility-administered medications for this visit  She is allergic to acetaminophen-codeine; aspirin; buprenorphine; compazine [prochlorperazine]; gabapentin; hydrocodone-acetaminophen; ibuprofen; imitrex [sumatriptan]; ketorolac; and morphine and related       Review of Systems   Constitutional: Negative for chills, fatigue and fever  HENT: Negative for congestion, ear pain, hearing loss, rhinorrhea and sore throat  Eyes: Negative for pain and visual disturbance  Respiratory: Negative for cough, shortness of breath and wheezing  Cardiovascular: Negative for chest pain, palpitations and leg swelling  Gastrointestinal: Positive for abdominal pain  Negative for blood in stool, constipation, diarrhea, nausea and vomiting  Endocrine: Negative for cold intolerance, heat intolerance and polyuria     Genitourinary: Positive for dysuria, frequency and urgency  Negative for difficulty urinating and hematuria  Musculoskeletal: Positive for back pain and neck pain  Negative for arthralgias, joint swelling and myalgias  Skin: Negative for rash and wound  Neurological: Negative for dizziness, syncope, weakness, numbness and headaches  Psychiatric/Behavioral: Negative for dysphoric mood and sleep disturbance  The patient is not nervous/anxious  Objective:      /74   Pulse 74   Temp (!) 96 9 °F (36 1 °C)   Resp 18   Ht 5' 2" (1 575 m)   Wt 57 2 kg (126 lb)   SpO2 98%   BMI 23 05 kg/m²          Physical Exam   Constitutional: She is oriented to person, place, and time  She appears well-developed and well-nourished  HENT:   Right Ear: Hearing, tympanic membrane, external ear and ear canal normal    Left Ear: Hearing, tympanic membrane, external ear and ear canal normal    Nose: Nose normal    Mouth/Throat: Oropharynx is clear and moist and mucous membranes are normal  No oropharyngeal exudate  Eyes: Conjunctivae, EOM and lids are normal  Pupils are equal, round, and reactive to light  Neck: Normal range of motion  Neck supple  Cardiovascular: Normal rate, regular rhythm, normal heart sounds and normal pulses  Exam reveals no gallop and no friction rub  No murmur heard  Pulmonary/Chest: Effort normal and breath sounds normal  No respiratory distress  She has no wheezes  She has no rales  Abdominal: Soft  Normal appearance and bowel sounds are normal  She exhibits no distension  There is tenderness in the right lower quadrant  There is no rigidity, no rebound, no guarding and no CVA tenderness  Musculoskeletal: She exhibits no edema  Left shoulder: She exhibits decreased range of motion, tenderness and bony tenderness  She exhibits normal strength  Cervical back: She exhibits tenderness, bony tenderness and spasm  Lumbar back: She exhibits tenderness and spasm     Lymphadenopathy:     She has no cervical adenopathy  Neurological: She is alert and oriented to person, place, and time  She has normal reflexes  No cranial nerve deficit  Skin: Skin is warm and dry  No rash noted  Psychiatric: She has a normal mood and affect  Her behavior is normal  Thought content normal    Nursing note and vitals reviewed

## 2018-03-16 NOTE — ASSESSMENT & PLAN NOTE
With concerns of increased urinary frequency and urgency, and also history of low back pain with right-sided sciatica, will order MRI for further evaluation of back pain and possible cause of urinary symptoms

## 2018-03-16 NOTE — ASSESSMENT & PLAN NOTE
Pain stable at this time  Refilled oxycodone  [employer] drug database, no concerns at this time  Last urine drug screen completed 09/2017

## 2018-03-19 NOTE — ED ATTENDING ATTESTATION
Jordana Rojas MD, saw and evaluated the patient  I have discussed the patient with the resident/non-physician practitioner and agree with the resident's/non-physician practitioner's findings, Plan of Care, and MDM as documented in the resident's/non-physician practitioner's note, except where noted  All available labs and Radiology studies were reviewed  At this point I agree with the current assessment done in the Emergency Department  I have conducted an independent evaluation of this patient a history and physical is as follows:    79-year-old female with history of multiple chronic painful conditions including irritable bowel syndrome, chronic pain, kidney stones, and history of multiple abdominal surgeries presented to the ED complaining of increased urinary frequency and abdominal pain  Patient states she has had these symptoms for about two weeks  She saw her primary care physician and was given Bactrim for suspected UTI based on hematuria  Patient complains of continued and worsening symptoms now with suprapubic pain and pain in her right lower quadrant flank  Pain is constant with waxing and waning  At times a described as sharp in nature    She has had nausea without vomiting  Otherwise, denies fevers/chills, neck pain, chest pain, shortness of breath, vaginal symptoms  No numbness or weakness  Patient feels she may have a kidney stone  She takes 30 mg of oxycodone several times a day for chronic pain  Surgical history includes hysterectomy with BSO, cholecystectomy  Physical exam:  GCS 15, nonfocal   Sclerae nonicteric, conjunctiva normal   Moist mucous membranes  Regular rate and rhythm, clear to auscultation bilaterally, the abdomen is soft and nondistended  There is mild diffuse tenderness without rebound or guarding  Normal bowel sounds  Extremities are nontender without edema    Impression plan:  79-year-old female with history of multiple surgeries and kidney stones presents complaining of urinary frequency and abdominal pain  Will check labs, urine, CT scan, treat sx  Reassess for disposition      Critical Care Time  CritCare Time    Procedures

## 2018-03-29 ENCOUNTER — HOSPITAL ENCOUNTER (OUTPATIENT)
Dept: MRI IMAGING | Facility: HOSPITAL | Age: 53
Discharge: HOME | End: 2018-03-29
Payer: COMMERCIAL

## 2018-03-29 ENCOUNTER — HOSPITAL ENCOUNTER (OUTPATIENT)
Dept: RADIOLOGY | Facility: HOSPITAL | Age: 53
Discharge: HOME | End: 2018-03-29
Payer: COMMERCIAL

## 2018-03-29 DIAGNOSIS — M54.41 CHRONIC BILATERAL LOW BACK PAIN WITH RIGHT-SIDED SCIATICA: ICD-10-CM

## 2018-03-29 DIAGNOSIS — R35.0 INCREASED URINARY FREQUENCY: ICD-10-CM

## 2018-03-29 DIAGNOSIS — M25.551 RIGHT HIP PAIN: ICD-10-CM

## 2018-03-29 DIAGNOSIS — G89.29 CHRONIC BILATERAL LOW BACK PAIN WITH RIGHT-SIDED SCIATICA: ICD-10-CM

## 2018-03-29 PROCEDURE — 72148 MRI LUMBAR SPINE W/O DYE: CPT

## 2018-03-29 PROCEDURE — 73502 X-RAY EXAM HIP UNI 2-3 VIEWS: CPT

## 2018-04-11 ENCOUNTER — TELEPHONE (OUTPATIENT)
Dept: FAMILY MEDICINE CLINIC | Facility: CLINIC | Age: 53
End: 2018-04-11

## 2018-04-12 DIAGNOSIS — G89.4 CHRONIC PAIN SYNDROME: ICD-10-CM

## 2018-04-12 RX ORDER — OXYCODONE HYDROCHLORIDE 30 MG/1
30 TABLET ORAL EVERY 6 HOURS PRN
Qty: 120 TABLET | Refills: 0 | Status: SHIPPED | OUTPATIENT
Start: 2018-04-12 | End: 2018-05-09 | Stop reason: SDUPTHER

## 2018-05-04 ENCOUNTER — TELEPHONE (OUTPATIENT)
Dept: FAMILY MEDICINE CLINIC | Facility: CLINIC | Age: 53
End: 2018-05-04

## 2018-05-04 ENCOUNTER — OFFICE VISIT (OUTPATIENT)
Dept: FAMILY MEDICINE CLINIC | Facility: CLINIC | Age: 53
End: 2018-05-04
Payer: COMMERCIAL

## 2018-05-04 VITALS
SYSTOLIC BLOOD PRESSURE: 118 MMHG | BODY MASS INDEX: 23.19 KG/M2 | RESPIRATION RATE: 18 BRPM | OXYGEN SATURATION: 97 % | WEIGHT: 126 LBS | HEIGHT: 62 IN | DIASTOLIC BLOOD PRESSURE: 70 MMHG | HEART RATE: 80 BPM | TEMPERATURE: 96.6 F

## 2018-05-04 DIAGNOSIS — R73.09 ELEVATED GLUCOSE: ICD-10-CM

## 2018-05-04 DIAGNOSIS — Z13.220 SCREENING CHOLESTEROL LEVEL: ICD-10-CM

## 2018-05-04 DIAGNOSIS — R42 DIZZINESS: Primary | ICD-10-CM

## 2018-05-04 PROCEDURE — 99213 OFFICE O/P EST LOW 20 MIN: CPT | Performed by: PHYSICIAN ASSISTANT

## 2018-05-04 PROCEDURE — 3008F BODY MASS INDEX DOCD: CPT | Performed by: PHYSICIAN ASSISTANT

## 2018-05-04 NOTE — ASSESSMENT & PLAN NOTE
Discussed with patient possible causes of dizziness  At this time would recommend making sure she carries around some can get with her for possible low blood sugar  Make sure to drink plenty water throughout the day  Will get lab work for further evaluation  Since symptoms appear similar to those that have been before she has a seizure, and also change in headache will order an MRI of the brain for further evaluation  Recommend contacting Neurology to make follow-up appointment  If everything comes back normal may also refer to Cardiology with history concerns of palpitations

## 2018-05-04 NOTE — PROGRESS NOTES
Assessment/Plan:    Dizziness  Discussed with patient possible causes of dizziness  At this time would recommend making sure she carries around some can get with her for possible low blood sugar  Make sure to drink plenty water throughout the day  Will get lab work for further evaluation  Since symptoms appear similar to those that have been before she has a seizure, and also change in headache will order an MRI of the brain for further evaluation  Recommend contacting Neurology to make follow-up appointment  If everything comes back normal may also refer to Cardiology with history concerns of palpitations  Diagnoses and all orders for this visit:    Dizziness  -     CBC and differential; Future  -     Comprehensive metabolic panel; Future  -     Cancel: Urinalysis with reflex to microscopic  -     MRI brain w wo contrast; Future  -     Magnesium; Future  -     Urinalysis with reflex to microscopic; Future    Screening cholesterol level  -     Lipid panel; Future    Elevated glucose  -     HEMOGLOBIN A1C W/ EAG ESTIMATION; Future          Subjective:      Patient ID: Ondina Garcia is a 47 y o  female  54-year-old female presenting with concerns of shakiness and dizziness x1 5 weeks  First episode happened about 1 5 weeks ago and states that it occurred while she was at the grocery store  States she was having headaches and some shakiness which is typically an early sign of a seizure for her, however she does state the headache does feel different than the 1 she typically gets  Afterwards she became dizzy  She had to sit down to help with symptoms  While there she was given some orange juice and gum, and afterwards her symptoms improved  About 3 hours later she had similar symptoms again while at home, had something to eat and then symptoms improved once again  Last episode was 5 days ago    At that time again she was experiencing the headache, shakiness, diaphoresis which then progressed into the dizziness  During these episodes she denies any chest pain, palpitations, shortness of breath  Denies any recent illness  Typically has a small breakfast in the morning, but does eat regularly throughout the day  Also drinks water throughout the day  Is following up with Neurology for seizures, however has not seen them recently  The following portions of the patient's history were reviewed and updated as appropriate:   She  has a past medical history of Anxiety; Cyst of joint of shoulder; Depression; Gastritis; Gastroparesis; H/O ovarian cystectomy; Irritable bowel; Migraine; Seizures ([facility] [address] ); and TMJ disease  She   Patient Active Problem List    Diagnosis Date Noted    Dizziness 05/04/2018    Chronic bilateral low back pain with right-sided sciatica 03/16/2018    Increased urinary frequency 03/16/2018    Palpitations 01/31/2018    Cervical radiculopathy 05/12/2017    Compression fracture of cervical vertebra (HCC) 05/12/2017    Chronic GERD 04/18/2016    Hyperlipidemia 07/02/2014    Chronic left shoulder pain 12/13/2013    Anxiety disorder 11/29/2012    Vitamin D deficiency 11/29/2012    Seizure, epileptic ([facility] [address] ) 11/16/2012    Benign essential hypertension 01/09/2012     She  has a past surgical history that includes Hysterectomy; Cholecystectomy; Mandible surgery; Rotator cuff repair; Foot surgery; Wrist fracture surgery; Temporomandibular joint surgery; Tubal ligation; and Ovarian cyst removal   Her family history includes Alcohol abuse in her father; Coronary artery disease in her father and mother; Depression in her mother; Diabetes in her mother; Hypertension in her father; Kidney failure in her father; Stomach cancer in her mother  She  reports that she has been smoking Cigarettes  She has been smoking about 0 50 packs per day  She has never used smokeless tobacco  She reports that she does not drink alcohol or use drugs    Current Outpatient Prescriptions   Medication Sig Dispense Refill    albuterol (VENTOLIN HFA) 90 mcg/act inhaler Inhale      fenofibrate (TRICOR) 145 mg tablet Take 1 tablet by mouth daily      fluticasone-salmeterol (ADVAIR DISKUS) 500-50 mcg/dose Inhale 1 puff 2 (two) times a day      hydrOXYzine HCL (ATARAX) 50 mg tablet Take 1 tablet by mouth      lamoTRIgine (LaMICtal) 100 mg tablet INDICATIONS  EPILEPSY  TAKE 2 TABLETS  BY MOUTH IN THE MORNING AND 2 & 1/2 TABLETS AT NIGHT  5    lisinopril (ZESTRIL) 10 mg tablet Take 1 tablet by mouth daily      montelukast (SINGULAIR) 10 mg tablet Take 1 tablet by mouth daily      omeprazole (PriLOSEC) 20 mg delayed release capsule Take 20 mg by mouth daily   oxyCODONE (ROXICODONE) 30 MG immediate release tablet Take 1 tablet (30 mg total) by mouth every 6 (six) hours as needed for moderate pain Max Daily Amount: 120 mg 120 tablet 0    PARoxetine (PAXIL) 20 mg tablet Take by mouth      phenazopyridine (PYRIDIUM) 200 mg tablet Take 1 tablet (200 mg total) by mouth 3 (three) times a day with meals 10 tablet 0     No current facility-administered medications for this visit  She is allergic to acetaminophen-codeine; aspirin; buprenorphine; compazine [prochlorperazine]; gabapentin; hydrocodone-acetaminophen; ibuprofen; imitrex [sumatriptan]; ketorolac; and morphine and related       Review of Systems   Constitutional: Negative for chills, fatigue and fever  HENT: Negative for congestion, ear discharge, ear pain, sinus pain, sinus pressure and sore throat  Eyes: Negative for visual disturbance  Respiratory: Negative for chest tightness and shortness of breath  Cardiovascular: Positive for palpitations  Negative for chest pain and leg swelling  Gastrointestinal: Negative for abdominal pain, constipation, diarrhea, nausea and vomiting  Genitourinary: Negative for dysuria  Neurological: Positive for dizziness, tremors and headaches  Negative for seizures, weakness and light-headedness  Psychiatric/Behavioral: Negative for dysphoric mood and sleep disturbance  The patient is not nervous/anxious  Objective:      /70   Pulse 80   Temp (!) 96 6 °F (35 9 °C) (Tympanic)   Resp 18   Ht 5' 2" (1 575 m)   Wt 57 2 kg (126 lb)   SpO2 97%   BMI 23 05 kg/m²          Physical Exam   Constitutional: She is oriented to person, place, and time  She appears well-developed and well-nourished  No distress  HENT:   Right Ear: External ear normal    Left Ear: External ear normal    Neck: Normal range of motion  Neck supple  Cardiovascular: Normal rate, regular rhythm and normal heart sounds  Exam reveals no gallop and no friction rub  No murmur heard  Pulmonary/Chest: Effort normal and breath sounds normal  No respiratory distress  She has no wheezes  She has no rales  Lymphadenopathy:     She has no cervical adenopathy  Neurological: She is alert and oriented to person, place, and time  No cranial nerve deficit  Skin: She is not diaphoretic  Psychiatric: She has a normal mood and affect   Her behavior is normal  Thought content normal

## 2018-05-06 DIAGNOSIS — G40.909 NONINTRACTABLE EPILEPSY WITHOUT STATUS EPILEPTICUS, UNSPECIFIED EPILEPSY TYPE (HCC): ICD-10-CM

## 2018-05-06 DIAGNOSIS — R00.2 PALPITATIONS: ICD-10-CM

## 2018-05-06 DIAGNOSIS — R42 DIZZINESS: Primary | ICD-10-CM

## 2018-05-07 ENCOUNTER — TELEPHONE (OUTPATIENT)
Dept: FAMILY MEDICINE CLINIC | Facility: CLINIC | Age: 53
End: 2018-05-07

## 2018-05-09 ENCOUNTER — TRANSCRIBE ORDERS (OUTPATIENT)
Dept: LAB | Facility: CLINIC | Age: 53
End: 2018-05-09

## 2018-05-09 ENCOUNTER — APPOINTMENT (OUTPATIENT)
Dept: LAB | Facility: CLINIC | Age: 53
End: 2018-05-09
Payer: COMMERCIAL

## 2018-05-09 DIAGNOSIS — R73.09 ELEVATED GLUCOSE: ICD-10-CM

## 2018-05-09 DIAGNOSIS — G89.4 CHRONIC PAIN SYNDROME: ICD-10-CM

## 2018-05-09 DIAGNOSIS — R42 DIZZINESS: ICD-10-CM

## 2018-05-09 DIAGNOSIS — Z13.220 SCREENING CHOLESTEROL LEVEL: ICD-10-CM

## 2018-05-09 LAB
ALBUMIN SERPL BCP-MCNC: 3.9 G/DL (ref 3.5–5)
ALP SERPL-CCNC: 90 U/L (ref 46–116)
ALT SERPL W P-5'-P-CCNC: 17 U/L (ref 12–78)
ANION GAP SERPL CALCULATED.3IONS-SCNC: 6 MMOL/L (ref 4–13)
AST SERPL W P-5'-P-CCNC: 13 U/L (ref 5–45)
BACTERIA UR QL AUTO: ABNORMAL /HPF
BASOPHILS # BLD AUTO: 0.05 THOUSANDS/ΜL (ref 0–0.1)
BASOPHILS NFR BLD AUTO: 1 % (ref 0–1)
BILIRUB SERPL-MCNC: 0.44 MG/DL (ref 0.2–1)
BILIRUB UR QL STRIP: NEGATIVE
BUN SERPL-MCNC: 9 MG/DL (ref 5–25)
CALCIUM SERPL-MCNC: 8.7 MG/DL (ref 8.3–10.1)
CAOX CRY URNS QL MICRO: ABNORMAL /HPF
CHLORIDE SERPL-SCNC: 107 MMOL/L (ref 100–108)
CHOLEST SERPL-MCNC: 171 MG/DL (ref 50–200)
CLARITY UR: ABNORMAL
CO2 SERPL-SCNC: 27 MMOL/L (ref 21–32)
COLOR UR: YELLOW
CREAT SERPL-MCNC: 0.68 MG/DL (ref 0.6–1.3)
EOSINOPHIL # BLD AUTO: 0.17 THOUSAND/ΜL (ref 0–0.61)
EOSINOPHIL NFR BLD AUTO: 2 % (ref 0–6)
ERYTHROCYTE [DISTWIDTH] IN BLOOD BY AUTOMATED COUNT: 13.5 % (ref 11.6–15.1)
EST. AVERAGE GLUCOSE BLD GHB EST-MCNC: 120 MG/DL
GFR SERPL CREATININE-BSD FRML MDRD: 100 ML/MIN/1.73SQ M
GLUCOSE P FAST SERPL-MCNC: 105 MG/DL (ref 65–99)
GLUCOSE UR STRIP-MCNC: NEGATIVE MG/DL
HBA1C MFR BLD: 5.8 % (ref 4.2–6.3)
HCT VFR BLD AUTO: 41.6 % (ref 34.8–46.1)
HDLC SERPL-MCNC: 39 MG/DL (ref 40–60)
HGB BLD-MCNC: 14.2 G/DL (ref 11.5–15.4)
HGB UR QL STRIP.AUTO: NEGATIVE
KETONES UR STRIP-MCNC: NEGATIVE MG/DL
LDLC SERPL CALC-MCNC: 86 MG/DL (ref 0–100)
LEUKOCYTE ESTERASE UR QL STRIP: ABNORMAL
LYMPHOCYTES # BLD AUTO: 3.19 THOUSANDS/ΜL (ref 0.6–4.47)
LYMPHOCYTES NFR BLD AUTO: 39 % (ref 14–44)
MAGNESIUM SERPL-MCNC: 2.2 MG/DL (ref 1.6–2.6)
MCH RBC QN AUTO: 31.4 PG (ref 26.8–34.3)
MCHC RBC AUTO-ENTMCNC: 34.1 G/DL (ref 31.4–37.4)
MCV RBC AUTO: 92 FL (ref 82–98)
MONOCYTES # BLD AUTO: 0.73 THOUSAND/ΜL (ref 0.17–1.22)
MONOCYTES NFR BLD AUTO: 9 % (ref 4–12)
NEUTROPHILS # BLD AUTO: 3.99 THOUSANDS/ΜL (ref 1.85–7.62)
NEUTS SEG NFR BLD AUTO: 49 % (ref 43–75)
NITRITE UR QL STRIP: POSITIVE
NON-SQ EPI CELLS URNS QL MICRO: ABNORMAL /HPF
NONHDLC SERPL-MCNC: 132 MG/DL
NRBC BLD AUTO-RTO: 0 /100 WBCS
PH UR STRIP.AUTO: 6 [PH] (ref 4.5–8)
PLATELET # BLD AUTO: 262 THOUSANDS/UL (ref 149–390)
PMV BLD AUTO: 10.6 FL (ref 8.9–12.7)
POTASSIUM SERPL-SCNC: 3.5 MMOL/L (ref 3.5–5.3)
PROT SERPL-MCNC: 7.2 G/DL (ref 6.4–8.2)
PROT UR STRIP-MCNC: NEGATIVE MG/DL
RBC # BLD AUTO: 4.52 MILLION/UL (ref 3.81–5.12)
RBC #/AREA URNS AUTO: ABNORMAL /HPF
SODIUM SERPL-SCNC: 140 MMOL/L (ref 136–145)
SP GR UR STRIP.AUTO: 1.02 (ref 1–1.03)
TRIGL SERPL-MCNC: 229 MG/DL
UROBILINOGEN UR QL STRIP.AUTO: 0.2 E.U./DL
WBC # BLD AUTO: 8.14 THOUSAND/UL (ref 4.31–10.16)
WBC #/AREA URNS AUTO: ABNORMAL /HPF

## 2018-05-09 PROCEDURE — 83735 ASSAY OF MAGNESIUM: CPT

## 2018-05-09 PROCEDURE — 80061 LIPID PANEL: CPT

## 2018-05-09 PROCEDURE — 81001 URINALYSIS AUTO W/SCOPE: CPT

## 2018-05-09 PROCEDURE — 83036 HEMOGLOBIN GLYCOSYLATED A1C: CPT

## 2018-05-09 PROCEDURE — 85025 COMPLETE CBC W/AUTO DIFF WBC: CPT

## 2018-05-09 PROCEDURE — 80053 COMPREHEN METABOLIC PANEL: CPT

## 2018-05-09 PROCEDURE — 36415 COLL VENOUS BLD VENIPUNCTURE: CPT

## 2018-05-09 RX ORDER — OXYCODONE HYDROCHLORIDE 30 MG/1
30 TABLET ORAL EVERY 6 HOURS PRN
Qty: 120 TABLET | Refills: 0 | Status: SHIPPED | OUTPATIENT
Start: 2018-05-09 | End: 2018-06-04 | Stop reason: SDUPTHER

## 2018-05-14 DIAGNOSIS — N30.00 ACUTE CYSTITIS WITHOUT HEMATURIA: Primary | ICD-10-CM

## 2018-05-14 RX ORDER — CEPHALEXIN 500 MG/1
500 CAPSULE ORAL EVERY 12 HOURS SCHEDULED
Qty: 14 CAPSULE | Refills: 0 | Status: SHIPPED | OUTPATIENT
Start: 2018-05-14 | End: 2018-05-21

## 2018-05-29 RX ORDER — METHOCARBAMOL 500 MG/1
500 TABLET, FILM COATED ORAL 3 TIMES DAILY
Refills: 2 | COMMUNITY
Start: 2018-02-23

## 2018-05-29 RX ORDER — GABAPENTIN 100 MG/1
100 CAPSULE ORAL 3 TIMES DAILY
Refills: 3 | COMMUNITY
Start: 2018-03-19

## 2018-06-04 DIAGNOSIS — G89.4 CHRONIC PAIN SYNDROME: ICD-10-CM

## 2018-06-05 RX ORDER — OXYCODONE HYDROCHLORIDE 30 MG/1
30 TABLET ORAL EVERY 6 HOURS PRN
Qty: 120 TABLET | Refills: 0 | Status: SHIPPED | OUTPATIENT
Start: 2018-06-05 | End: 2018-07-03 | Stop reason: SDUPTHER

## 2018-06-16 ENCOUNTER — HOSPITAL ENCOUNTER (OUTPATIENT)
Dept: MRI IMAGING | Facility: HOSPITAL | Age: 53
Discharge: HOME | End: 2018-06-16
Payer: COMMERCIAL

## 2018-06-16 DIAGNOSIS — R42 DIZZINESS: ICD-10-CM

## 2018-06-16 PROCEDURE — 70551 MRI BRAIN STEM W/O DYE: CPT

## 2018-06-26 ENCOUNTER — TELEPHONE (OUTPATIENT)
Dept: FAMILY MEDICINE CLINIC | Facility: CLINIC | Age: 53
End: 2018-06-26

## 2018-06-28 ENCOUNTER — TELEPHONE (OUTPATIENT)
Dept: FAMILY MEDICINE CLINIC | Facility: CLINIC | Age: 53
End: 2018-06-28

## 2018-07-02 ENCOUNTER — TELEPHONE (OUTPATIENT)
Dept: FAMILY MEDICINE CLINIC | Facility: CLINIC | Age: 53
End: 2018-07-02

## 2018-07-02 DIAGNOSIS — J45.909 ASTHMA, UNSPECIFIED ASTHMA SEVERITY, UNSPECIFIED WHETHER COMPLICATED, UNSPECIFIED WHETHER PERSISTENT: Primary | ICD-10-CM

## 2018-07-02 RX ORDER — ALBUTEROL SULFATE 90 UG/1
2 AEROSOL, METERED RESPIRATORY (INHALATION) EVERY 6 HOURS PRN
Qty: 1 INHALER | Refills: 0 | Status: SHIPPED | OUTPATIENT
Start: 2018-07-02

## 2018-07-02 NOTE — TELEPHONE ENCOUNTER
The office policy is 5 days for narcotic refills  This can be addressed by Wild Wilson on Friday  I did send over a prescription for the Ventolin inhaler

## 2018-07-03 DIAGNOSIS — G89.4 CHRONIC PAIN SYNDROME: ICD-10-CM

## 2018-07-03 RX ORDER — OXYCODONE HYDROCHLORIDE 30 MG/1
30 TABLET ORAL EVERY 6 HOURS PRN
Qty: 20 TABLET | Refills: 0 | Status: SHIPPED | OUTPATIENT
Start: 2018-07-03 | End: 2018-07-06 | Stop reason: SDUPTHER

## 2018-07-03 NOTE — TELEPHONE ENCOUNTER
I did not get the request for the refill until July 2nd    I will send over 20 tablets and this needs to be further addressed by Josey Fonseca upon his return on Friday

## 2018-07-06 ENCOUNTER — TELEPHONE (OUTPATIENT)
Dept: FAMILY MEDICINE CLINIC | Facility: CLINIC | Age: 53
End: 2018-07-06

## 2018-07-06 DIAGNOSIS — G89.4 CHRONIC PAIN SYNDROME: ICD-10-CM

## 2018-07-06 DIAGNOSIS — G89.29 CHRONIC LEFT SHOULDER PAIN: ICD-10-CM

## 2018-07-06 DIAGNOSIS — M54.41 CHRONIC BILATERAL LOW BACK PAIN WITH RIGHT-SIDED SCIATICA: Primary | ICD-10-CM

## 2018-07-06 DIAGNOSIS — M25.512 CHRONIC LEFT SHOULDER PAIN: ICD-10-CM

## 2018-07-06 DIAGNOSIS — G89.29 CHRONIC BILATERAL LOW BACK PAIN WITH RIGHT-SIDED SCIATICA: Primary | ICD-10-CM

## 2018-07-06 DIAGNOSIS — S12.9XXD COMPRESSION FRACTURE OF CERVICAL SPINE WITH ROUTINE HEALING, SUBSEQUENT ENCOUNTER: ICD-10-CM

## 2018-07-06 RX ORDER — OXYCODONE HYDROCHLORIDE 10 MG/1
10 TABLET ORAL EVERY 8 HOURS PRN
Qty: 90 TABLET | Refills: 0 | Status: SHIPPED | OUTPATIENT
Start: 2018-07-06 | End: 2018-07-30 | Stop reason: SDUPTHER

## 2018-07-06 NOTE — TELEPHONE ENCOUNTER
Refilled was sent to the pharmacy, however due to new narcotic policies for this office, the dose of the medication was significantly decreased  We are no longer prescribing no more than 45 morphine milligram equivalents daily  If she will require more than this to help with the pain, she will need to follow up with pain management  No

## 2018-07-13 ENCOUNTER — TELEPHONE (OUTPATIENT)
Dept: PAIN MEDICINE | Facility: MEDICAL CENTER | Age: 53
End: 2018-07-13

## 2018-07-30 DIAGNOSIS — F41.9 ANXIETY DISORDER, UNSPECIFIED TYPE: Primary | ICD-10-CM

## 2018-07-30 DIAGNOSIS — G89.4 CHRONIC PAIN SYNDROME: ICD-10-CM

## 2018-07-30 DIAGNOSIS — K21.00 REFLUX ESOPHAGITIS: ICD-10-CM

## 2018-08-01 RX ORDER — OXYCODONE HYDROCHLORIDE 10 MG/1
10 TABLET ORAL EVERY 8 HOURS PRN
Qty: 90 TABLET | Refills: 0 | Status: SHIPPED | OUTPATIENT
Start: 2018-08-01

## 2018-08-01 RX ORDER — PAROXETINE HYDROCHLORIDE 20 MG/1
20 TABLET, FILM COATED ORAL DAILY
Qty: 30 TABLET | Refills: 2 | Status: SHIPPED | OUTPATIENT
Start: 2018-08-01 | End: 2018-09-17 | Stop reason: SDUPTHER

## 2018-08-01 RX ORDER — OMEPRAZOLE 20 MG/1
20 CAPSULE, DELAYED RELEASE ORAL DAILY
Qty: 90 CAPSULE | Refills: 1 | Status: SHIPPED | OUTPATIENT
Start: 2018-08-01

## 2018-08-01 RX ORDER — HYDROXYZINE 50 MG/1
50 TABLET, FILM COATED ORAL EVERY 6 HOURS PRN
Qty: 120 TABLET | Refills: 2 | Status: SHIPPED | OUTPATIENT
Start: 2018-08-01 | End: 2018-09-17 | Stop reason: SDUPTHER

## 2018-08-08 ENCOUNTER — TELEPHONE (OUTPATIENT)
Dept: FAMILY MEDICINE CLINIC | Facility: CLINIC | Age: 53
End: 2018-08-08

## 2018-08-09 ENCOUNTER — TELEPHONE (OUTPATIENT)
Dept: PAIN MEDICINE | Facility: MEDICAL CENTER | Age: 53
End: 2018-08-09

## 2018-08-09 ENCOUNTER — TELEPHONE (OUTPATIENT)
Dept: FAMILY MEDICINE CLINIC | Facility: CLINIC | Age: 53
End: 2018-08-09

## 2018-08-09 NOTE — TELEPHONE ENCOUNTER
Practice Administrator called on behalf of patient  Patient has been relentless on getting into [facility] for a consult for:  Chronic pain syndrome  Chronic bilateral low back pain with right-sided sciatica  Chronic left shoulder pain  Compression fracture of cervical spine with routine healing, subsequent encounter     States patient was a no show and received a letter DNS  Per  patient feels this is unfair not to schedule and will call the dept of health

## 2018-08-09 NOTE — TELEPHONE ENCOUNTER
Pt is calling asking to r/s appt per previous note she is a DNS  Pt states she was called into work that day at [address] and completely forgot to call to r/s her appt that she had on 7/13  Please advise    Can pt be r/s due to her valid reason for not showing? Pt called back stating she is in a lot of pain and will do anything to come in  Pt states she is currently taking oxycodone 10 mg from her PCP and they are not prescribing any higher for her and that mg is not helping her so she would need to get in as soon as possible to be seen  Please advise regarding this new info

## 2018-08-09 NOTE — TELEPHONE ENCOUNTER
I have nothing to offer    If she works for [employer] she should seek pain management consult at [phone] not schedule with me

## 2018-08-09 NOTE — TELEPHONE ENCOUNTER
Have had several phone calls with Charlotte Harmon regarding pain management  Recently, Charlotte Harmon was discharged from pain management r/t a no show  I called pain management on the patients behalf to see if an exception could be made to accept Charlotte Flavors  I was advised by the pain management office that Charlotte Harmon can not be see in their office r/t the no show and the high dosing of medication she has been on  I was advised that as per Dr Ana Sun, the director of pain management that this patient is not to be scheduled with pain management  I did advise the patient of my conversation and attempts to have her seen by pain management  The patient advised that she has filed a grievance with her insurance company regarding the pain management office  I did advise the office of this when I called

## 2018-08-12 ENCOUNTER — HOSPITAL ENCOUNTER (EMERGENCY)
Facility: HOSPITAL | Age: 53
Discharge: LEFT BEFORE DISCHARGE | End: 2018-08-12
Attending: EMERGENCY MEDICINE
Payer: COMMERCIAL

## 2018-08-12 ENCOUNTER — APPOINTMENT (EMERGENCY)
Dept: RADIOLOGY | Facility: HOSPITAL | Age: 53
End: 2018-08-12
Payer: COMMERCIAL

## 2018-08-12 VITALS
OXYGEN SATURATION: 96 % | BODY MASS INDEX: 25.04 KG/M2 | HEART RATE: 96 BPM | SYSTOLIC BLOOD PRESSURE: 158 MMHG | DIASTOLIC BLOOD PRESSURE: 88 MMHG | WEIGHT: 136.9 LBS | RESPIRATION RATE: 18 BRPM | TEMPERATURE: 97.9 F

## 2018-08-12 DIAGNOSIS — R07.9 CHEST PAIN: Primary | ICD-10-CM

## 2018-08-12 PROCEDURE — 99285 EMERGENCY DEPT VISIT HI MDM: CPT

## 2018-08-12 PROCEDURE — 93005 ELECTROCARDIOGRAM TRACING: CPT

## 2018-08-12 RX ORDER — ACETAMINOPHEN 325 MG/1
975 TABLET ORAL ONCE
Status: DISCONTINUED | OUTPATIENT
Start: 2018-08-12 | End: 2018-08-13 | Stop reason: HOSPADM

## 2018-08-13 NOTE — ED PROVIDER NOTES
History  Chief Complaint   Patient presents with    Chest Pain     pt reports midsternal CP radiating to Left neck starting after witnessing her daughters fighting, describes as sharp, denies SOB     Patient presents for chest tightness that started after she witnessed her daughter's fighting tonight  Patient denies any injuries but states she might have strained her left shoulder trying to break them up  Patient started feeling lightheaded and dizzy and then chest tightness when she witnessed the events tonight  Pain is unchanged  No cardiac history  History provided by:  Patient   used: No    Chest Pain   Pain location:  Substernal area  Pain quality: tightness    Pain radiates to:  Neck and upper back  Pain radiates to the back: yes    Pain severity:  Moderate  Onset quality:  Gradual  Duration:  1 hour  Timing:  Constant  Progression:  Unchanged  Chronicity:  New  Context: stress    Relieved by:  None tried  Worsened by:  Nothing tried  Ineffective treatments:  None tried  Associated symptoms: anxiety, back pain, dizziness, nausea and shortness of breath    Associated symptoms: no abdominal pain, no altered mental status, no syncope and not vomiting    Risk factors: smoking    Risk factors: no coronary artery disease        Prior to Admission Medications   Prescriptions Last Dose Informant Patient Reported? Taking?    PARoxetine (PAXIL) 20 mg tablet   No No   Sig: Take 1 tablet (20 mg total) by mouth daily   albuterol (VENTOLIN HFA) 90 mcg/act inhaler   No No   Sig: Inhale 2 puffs every 6 (six) hours as needed for wheezing   fenofibrate (TRICOR) 145 mg tablet   Yes No   Sig: Take 1 tablet by mouth daily   fluticasone-salmeterol (ADVAIR DISKUS) 500-50 mcg/dose   Yes No   Sig: Inhale 1 puff 2 (two) times a day   gabapentin (NEURONTIN) 100 mg capsule   Yes No   Sig: Take 100 mg by mouth 3 (three) times a day   hydrOXYzine HCL (ATARAX) 50 mg tablet   No No   Sig: Take 1 tablet (50 mg total) by mouth every 6 (six) hours as needed for itching or anxiety   lamoTRIgine (LaMICtal) 100 mg tablet   Yes No   Sig: INDICATIONS  EPILEPSY  TAKE 2 TABLETS  BY MOUTH IN THE MORNING AND 2 & 1/2 TABLETS AT NIGHT  lisinopril (ZESTRIL) 10 mg tablet   Yes No   Sig: Take 1 tablet by mouth daily   methocarbamol (ROBAXIN) 500 mg tablet   Yes No   Si mg 3 (three) times a day   montelukast (SINGULAIR) 10 mg tablet   Yes No   Sig: Take 1 tablet by mouth daily   omeprazole (PriLOSEC) 20 mg delayed release capsule   No No   Sig: Take 1 capsule (20 mg total) by mouth daily   oxyCODONE (ROXICODONE) 10 MG TABS   No No   Sig: Take 1 tablet (10 mg total) by mouth every 8 (eight) hours as needed for moderate pain Max Daily Amount: 30 mg   phenazopyridine (PYRIDIUM) 200 mg tablet   No No   Sig: Take 1 tablet (200 mg total) by mouth 3 (three) times a day with meals      Facility-Administered Medications: None       Past Medical History:   Diagnosis Date    Anxiety     Cyst of joint of shoulder     Depression     Gastritis     Gastroparesis     H/O ovarian cystectomy     Irritable bowel     Migraine     Seizures (HCC)     TMJ disease        Past Surgical History:   Procedure Laterality Date    CHOLECYSTECTOMY      FOOT SURGERY      HYSTERECTOMY      MANDIBLE SURGERY      OVARIAN CYST REMOVAL      ROTATOR CUFF REPAIR      TEMPOROMANDIBULAR JOINT SURGERY      Condylotomy of TMJ     TUBAL LIGATION      WRIST FRACTURE SURGERY         Family History   Problem Relation Age of Onset    Coronary artery disease Mother     Diabetes Mother     Depression Mother     Stomach cancer Mother     Coronary artery disease Father     Alcohol abuse Father     Hypertension Father     Kidney failure Father      I have reviewed and agree with the history as documented      Social History   Substance Use Topics    Smoking status: Current Every Day Smoker     Packs/day: 0 50     Types: Cigarettes    Smokeless tobacco: Never Used    Alcohol use No        Review of Systems   Respiratory: Positive for chest tightness and shortness of breath  Cardiovascular: Positive for chest pain  Negative for syncope  Gastrointestinal: Positive for nausea  Negative for abdominal pain and vomiting  Musculoskeletal: Positive for back pain  Skin: Negative for color change, pallor and wound  Allergic/Immunologic: Negative for immunocompromised state  Neurological: Positive for dizziness and light-headedness  All other systems reviewed and are negative  Physical Exam  Physical Exam   Constitutional: She is oriented to person, place, and time  She appears well-developed and well-nourished  HENT:   Head: Normocephalic and atraumatic  Eyes: Conjunctivae are normal    Neck: Normal range of motion  Neck supple  Cardiovascular: Normal rate, regular rhythm, normal heart sounds and intact distal pulses  Exam reveals no friction rub  No murmur heard  Pulmonary/Chest: Effort normal and breath sounds normal  No respiratory distress  She has no wheezes  She has no rales  Abdominal: Soft  She exhibits no distension  There is no tenderness  There is no rebound and no guarding  Musculoskeletal: Normal range of motion  She exhibits no edema, tenderness or deformity  Left shoulder: She exhibits normal range of motion, no tenderness, no bony tenderness, no swelling, no effusion, no crepitus, no deformity, normal pulse and normal strength  Neurological: She is alert and oriented to person, place, and time  Skin: Skin is warm and dry  Psychiatric: Her mood appears anxious  Nursing note and vitals reviewed        Vital Signs  ED Triage Vitals [08/12/18 2227]   Temperature Pulse Respirations Blood Pressure SpO2   97 9 °F (36 6 °C) 96 18 158/88 96 %      Temp Source Heart Rate Source Patient Position - Orthostatic VS BP Location FiO2 (%)   Oral Monitor Lying Right arm --      Pain Score       7           Vitals:    08/12/18 2227   BP: 158/88   Pulse: 96   Patient Position - Orthostatic VS: Lying       Visual Acuity      ED Medications  Medications   sodium chloride 0 9 % bolus 1,000 mL (not administered)   acetaminophen (TYLENOL) tablet 975 mg (not administered)       Diagnostic Studies  Results Reviewed     Procedure Component Value Units Date/Time    Comprehensive metabolic panel [[id number]]     Lab Status:  No result Specimen:  Blood     Troponin I [[id number]]     Lab Status:  No result Specimen:  Blood     CBC and differential [[id number]]     Lab Status:  No result Specimen:  Blood                  XR chest 2 views    (Results Pending)   XR shoulder 2+ views LEFT    (Results Pending)              Procedures  ECG 12 Lead Documentation  Date/Time: 8/12/2018 10:52 PM  Performed by: Taye Dunn  Authorized by: Taye Dunn     Indications / Diagnosis:  Chest pain  Patient location:  ED  Previous ECG:     Previous ECG:  Compared to current    Similarity:  No change  Interpretation:     Interpretation: normal    Rate:     ECG rate:  87    ECG rate assessment: normal    Rhythm:     Rhythm: sinus rhythm    Ectopy:     Ectopy: none    QRS:     QRS axis:  Normal    QRS intervals:  Normal  Conduction:     Conduction: normal    ST segments:     ST segments:  Normal  T waves:     T waves: normal             Phone Contacts  ED Phone Contact    ED Course                               MDM  Number of Diagnoses or Management Options  Chest pain: new and requires workup  Diagnosis management comments: Patient is a 50-year-old female that presents for chest tightness that started after she witnessed her daughter's fighting  She became dizzy, lightheaded had chest tightness, shortness of breath and shoulder pain  Patient denies any injuries but states that she has chronic left-sided shoulder pain which she might have exacerbated by trying to separate her daughters    She states that she feels like this might just be anxiety related since she does have anxiety  Patient has no cardiac history but does have hypertension  She has coronary artery disease in her family  Plan is for a cardiac workup with a delta troponin  11:12 PM  The patient insists on leaving against medical advice, despite my recommendation to remain for ongoing treatment     1: Capacity: I have determined that the patient has capacity to make the decision to leave against medical advice based on the following:    A  Ability to express a choice: The patient is able to express his or her choice and communicate that choice  [place] to understand relevant information: The patient is able to verbalize their diagnosis, understand information about the purpose of treatment, remember the information, and show that he or she can be part of the decision-making process     C  Ability to appreciate the significance of the information and its consequences: The patient understands the consequences of treatment refusal and the risks and benefits of accepting or refusing treatment     D  Ability to manipulate information: The patient is able to engage in reasoning as it applies to making treatment decisions   2: Psychiatric Consultation: There is not an indication to call psychiatry consultation to determine capacity    3  Alternative Treatment: I have discussed the recommended course of treatment and available alternatives  Emergency Department observation, speaking with her family, x-rays, blood work  4  Risks: I have discussed the specific risks of that patient refusing treatment    5  Follow-up Care: I have discussed the follow-up care and advised to see patient's PCP immediately or return here for worsening  6  ED Option: I have emphasized that the patient has the option to return to the ED  Reviewed that we can have continuation of the workup at any time and that we are always open            Amount and/or Complexity of Data Reviewed  Clinical lab tests: ordered and reviewed  Tests in the radiology section of CPT®: ordered and reviewed  Tests in the medicine section of CPT®: reviewed and ordered  Review and summarize past medical records: yes      CritCare Time    Disposition  Final diagnoses:   Chest pain     Time reflects when diagnosis was documented in both MDM as applicable and the Disposition within this note     Time User Action Codes Description Comment    8/12/2018 11:08 PM Ally Davila Add [R07 9] Chest pain       ED Disposition     ED Disposition Condition Comment    AMA  Date: 8/12/2018  Patient: Ledy Meade  Admitted: 8/12/2018 10:25 PM  Attending Provider: Lizbeth Silva DO    Ledy Meade or her authorized caregiver has made the decision for the patient to leave the emergency department against the advice  of her attending physician  She or her authorized caregiver has been informed and understands the inherent risks, including death, heart attack, stroke, collapsed lung, vascular dissection, high blood pressure comma loss of current lifestyle or function , loss of limb, etc   She or her authorized caregiver has decided to accept the responsibility for this decision  Ledy Meade and all necessary parties have been advised that she may return for further evaluation or treatment  Her condition at time of  discharge was stable  Ledy Meade had current vital signs as follows:  /88 (BP Location: Right arm)   Pulse 96   Temp 97 9 °F (36 6 °C) (Oral)   Resp 18   Wt 62 1 kg (136 lb 14 4 oz)         Follow-up Information     Follow up With Specialties Details Why Contact Info    Shikha Melgoza PA-C Family Medicine, Physician Assistant Call today To schedule an appointment as soon as you can [address] [address] [address] [place] [address]  [phone]            Patient's Medications   Discharge Prescriptions    No medications on file     No discharge procedures on file      ED Provider  Electronically Signed by           Mikey Duncan Francisco Ludwig ,   08/12/18 3988

## 2018-08-13 NOTE — DISCHARGE INSTRUCTIONS
Chest Pain   WHAT YOU NEED TO KNOW:   Chest pain can be caused by a range of conditions, from not serious to life-threatening  Chest pain can be a symptom of a digestive problem, such as acid reflux or a stomach ulcer  An anxiety attack or a strong emotion, such as anger, can also cause chest pain  Infection, inflammation, or a fracture in the bones or cartilage in your chest can cause pain or discomfort  Sometimes chest pain or pressure is caused by poor blood flow to your heart (angina)  Chest pain may also be caused by life-threatening conditions such as a heart attack or blood clot in your lungs  DISCHARGE INSTRUCTIONS:   Call 911 if:   · You have any of the following signs of a heart attack:      ¨ Squeezing, pressure, or pain in your chest that lasts longer than 5 minutes or returns    ¨ Discomfort or pain in your back, neck, jaw, stomach, or arm     ¨ Trouble breathing    ¨ Nausea or vomiting    ¨ Lightheadedness or a sudden cold sweat, especially with chest pain or trouble breathing    Return to the emergency department if:   · You have chest discomfort that gets worse, even with medicine  · You cough or vomit blood  · Your bowel movements are black or bloody  · You cannot stop vomiting, or it hurts to swallow  Contact your healthcare provider if:   · You have questions or concerns about your condition or care  Medicines:   · Medicines  may be given to treat the cause of your chest pain  Examples include pain medicine, anxiety medicine, or medicines to increase blood flow to your heart  · Do not take certain medicines without asking your healthcare provider first   These include NSAIDs, herbal or vitamin supplements, or hormones (estrogen or progestin)  · Take your medicine as directed  Contact your healthcare provider if you think your medicine is not helping or if you have side effects  Tell him or her if you are allergic to any medicine   Keep a list of the medicines, vitamins, and herbs you take  Include the amounts, and when and why you take them  Bring the list or the pill bottles to follow-up visits  Carry your medicine list with you in case of an emergency  Follow up with your healthcare provider within 72 hours, or as directed: You may need to return for more tests to find the cause of your chest pain  You may be referred to a specialist, such as a cardiologist or gastroenterologist  Write down your questions so you remember to ask them during your visits  Healthy living tips: The following are general healthy guidelines  If your chest pain is caused by a heart problem, your healthcare provider will give you specific guidelines to follow  · Do not smoke  Nicotine and other chemicals in cigarettes and cigars can cause lung and heart damage  Ask your healthcare provider for information if you currently smoke and need help to quit  E-cigarettes or smokeless tobacco still contain nicotine  Talk to your healthcare provider before you use these products  · Eat a variety of healthy, low-fat foods  Healthy foods include fruits, vegetables, whole-grain breads, low-fat dairy products, beans, lean meats, and fish  Ask for more information about a heart healthy diet  · Ask about activity  Your healthcare provider will tell you which activities to limit or avoid  Ask when you can drive, return to work, and have sex  Ask about the best exercise plan for you  · Maintain a healthy weight  Ask your healthcare provider how much you should weigh  Ask him or her to help you create a weight loss plan if you are overweight  · Get the flu and pneumonia vaccines  All adults should get the influenza (flu) vaccine  Get it every year as soon as it becomes available  The pneumococcal vaccine is given to adults aged 72 years or older  The vaccine is given every 5 years to prevent pneumococcal disease, such as pneumonia    © 2017 [employer] Information is for End User's use only and may not be sold, redistributed or otherwise used for commercial purposes  All illustrations and images included in CareNotes® are the copyrighted property of [employer]  or Dick Parikh  The above information is an  only  It is not intended as medical advice for individual conditions or treatments  Talk to your doctor, nurse or pharmacist before following any medical regimen to see if it is safe and effective for you  Chest Pain   WHAT YOU NEED TO KNOW:   What causes chest pain? Chest pain can be caused by a range of conditions, from not serious to life-threatening  Chest pain can be a symptom of a digestive problem, such as acid reflux or a stomach ulcer  An anxiety attack or a strong emotion, such as anger, can also cause chest pain  Infection, inflammation, or a fracture in the bones or cartilage in your chest can cause pain or discomfort  Sometimes chest pain or pressure is caused by poor blood flow to your heart (angina)  Chest pain may also be caused by life-threatening conditions such as a heart attack or blood clot in your lungs  What other symptoms might I have with chest pain? · A burning feeling behind your breastbone    · A racing or slow heartbeat     · Fever or sweating     · Nausea or vomiting     · Shortness of breath     · Discomfort or pressure that spreads from your chest to your back, jaw, or arm     · Feeling weak, tired, or faint  How is the cause of chest pain diagnosed? Your healthcare provider will examine you  Describe your chest pain in as much detail as possible  Tell him or her where your pain is and when it began  Tell the provider if you notice anything that makes the pain worse or better  Tell him or her if it is constant or comes and goes  Your healthcare provider will ask about any medicines you use and medical conditions you have  He or she will also examine you   You may also need any of the following tests:  · An EKG  is a test that records your heart's electrical activity  · Blood tests  check for heart damage and signs of a heart attack  · An echocardiogram  uses sound waves to see if blood is flowing normally through your heart  · An ultrasound, x-ray, CT, or MRI scan  may show the cause of your chest pain  You may be given contrast liquid to help your heart show up better in the pictures  Tell the healthcare provider if you have ever had an allergic reaction to contrast liquid  Do not enter the MRI room with anything metal  Metal can cause serious injury  Tell the healthcare provider if you have any metal in or on your body  · An endoscopy  may be done to check for ulcers or problem with your esophagus  How is chest pain treated? Medicines may be given to treat the cause of your chest pain  Examples include pain medicine, anxiety medicine, or medicines to increase blood flow to your heart  Do not  take certain medicines without asking your healthcare provider first  These include NSAIDs, herbal or vitamin supplements, or hormones (estrogen or progestin)  What are some healthy living tips? The following are general healthy guidelines  If your chest pain is caused by a heart problem, your healthcare provider will give you specific guidelines to follow  · Do not smoke  Nicotine and other chemicals in cigarettes and cigars can cause lung and heart damage  Ask your healthcare provider for information if you currently smoke and need help to quit  E-cigarettes or smokeless tobacco still contain nicotine  Talk to your healthcare provider before you use these products  · Eat a variety of healthy, low-fat foods  Healthy foods include fruits, vegetables, whole-grain breads, low-fat dairy products, beans, lean meats, and fish  Ask for more information about a heart healthy diet  · Ask about activity  Your healthcare provider will tell you which activities to limit or avoid   Ask when you can drive, return to work, and have sex  Ask about the best exercise plan for you  · Maintain a healthy weight  Ask your healthcare provider how much you should weigh  Ask him or her to help you create a weight loss plan if you are overweight  Call 911 if:   · You have any of the following signs of a heart attack:      ¨ Squeezing, pressure, or pain in your chest that lasts longer than 5 minutes or returns    ¨ Discomfort or pain in your back, neck, jaw, stomach, or arm     ¨ Trouble breathing    ¨ Nausea or vomiting    ¨ Lightheadedness or a sudden cold sweat, especially with chest pain or trouble breathing    When should I seek immediate care? · You have chest discomfort that gets worse, even with medicine  · You cough or vomit blood  · Your bowel movements are black or bloody  · You cannot stop vomiting, or it hurts to swallow  When should I contact my healthcare provider? · You have questions or concerns about your condition or care  CARE AGREEMENT:   You have the right to help plan your care  Learn about your health condition and how it may be treated  Discuss treatment options with your caregivers to decide what care you want to receive  You always have the right to refuse treatment  The above information is an  only  It is not intended as medical advice for individual conditions or treatments  Talk to your doctor, nurse or pharmacist before following any medical regimen to see if it is safe and effective for you  © 2017 [employer] Information is for End User's use only and may not be sold, redistributed or otherwise used for commercial purposes  All illustrations and images included in CareNotes® are the copyrighted property of [employer]  or Dick Kati  Panic Attack   WHAT YOU NEED TO KNOW:   A panic attack is a sudden, strong feeling of fear even though you are not in danger  You also have physical symptoms such as rapid breathing or heavy sweating   Symptoms are usually worst about 10 minutes after they start and can last up to 20 minutes  You may feel like you are having a heart attack  You may have a panic attack before an event, such as a public speech you have to give  A panic attack can also happen for no clear reason  Frequent panic attacks may be a sign of a panic disorder that needs long-term treatment  DISCHARGE INSTRUCTIONS:   Return to the emergency department if:   · You have severe chest pain, shortness of breath, or irregular heartbeats  · You have thoughts of harming yourself or another person  Contact your healthcare provider if:   · You have new or worsening panic attacks after treatment  · You have questions or concerns about your condition or care  Medicines:   · Medicines  may be given to make you feel more relaxed or to reduce anxiety that causes a panic attack  Some medicines are taken only when you are having a panic attack  Other medicines can be taken to prevent panic attacks  · Take your medicine as directed  Contact your healthcare provider if you think your medicine is not helping or if you have side effects  Tell him of her if you are allergic to any medicine  Keep a list of the medicines, vitamins, and herbs you take  Include the amounts, and when and why you take them  Bring the list or the pill bottles to follow-up visits  Carry your medicine list with you in case of an emergency  Follow up with your healthcare provider as directed:  Write down your questions so you remember to ask them during your visits  Manage or prevent a panic attack:   · Manage stress  Stress can trigger a panic attack  Yoga and meditation are good ways to help manage stress  It might be helpful to talk to someone about the stress in your life  · Exercise as directed  Exercise can reduce stress and help you sleep better  Your healthcare provider can help you create an exercise plan  · Set a sleep schedule    Too little sleep can increase anxiety  Go to bed at the same time each night and wake up at the same time each morning  Keep your room quiet and free from distractions, such as a television or computer  · Limit alcohol and caffeine  Alcohol and caffeine can both increase anxiety and make it difficult for you to sleep well  A drink of alcohol is 12 ounces of beer, 5 ounces of wine, or 1½ ounces of liquor  · Eat a variety of healthy foods  Healthy foods include fruits, vegetables, low-fat dairy products, lean meats, fish, and beans  Limit sugar  Sugar can increase your symptoms  · Do not smoke  Nicotine and other chemicals in cigarettes and cigars can increase anxiety and also cause lung damage  Ask your healthcare provider for information if you currently smoke and need help to quit  E-cigarettes or smokeless tobacco still contain nicotine  Talk to your healthcare provider before you use these products  © 2017 [employer]  Information is for End User's use only and may not be sold, redistributed or otherwise used for commercial purposes  All illustrations and images included in CareNotes® are the copyrighted property of [employer]  or Dick Parikh  The above information is an  only  It is not intended as medical advice for individual conditions or treatments  Talk to your doctor, nurse or pharmacist before following any medical regimen to see if it is safe and effective for you

## 2018-08-14 LAB
ATRIAL RATE: 87 BPM
P AXIS: 75 DEGREES
PR INTERVAL: 134 MS
QRS AXIS: 63 DEGREES
QRSD INTERVAL: 84 MS
QT INTERVAL: 348 MS
QTC INTERVAL: 418 MS
T WAVE AXIS: 68 DEGREES
VENTRICULAR RATE: 87 BPM

## 2018-08-14 PROCEDURE — 93010 ELECTROCARDIOGRAM REPORT: CPT | Performed by: INTERNAL MEDICINE

## 2018-08-23 ENCOUNTER — PATIENT OUTREACH (OUTPATIENT)
Dept: FAMILY MEDICINE CLINIC | Facility: CLINIC | Age: 53
End: 2018-08-23

## 2018-08-23 NOTE — PROGRESS NOTES
SW Care Manager spoke with PT in regards to her pain management needs  PT is asking for assistance in how to now obtain pain medication after being denied at pain management (Dr Yolanda Hale office) following a no show for her first appointment  Per their office policies, the pain management office is allowed to deny a person service if they do not attend their appointment(s), even the first time  Prior to being referred to pain management, the PT's PCP at [facility] was prescribing this medication  Per regulation, the PT must at some point be referred to pain management in order to continue managing and prescribing their pain medications based on how long they are on the medicine and the dosage  Since the PT was discharged due to no show, we at the PCP office are unable to continue prescribing this medication  PT continued to ask why she was being "cut off" from her medicine and NANETTE as well as  Mark Alfaro attempted explaining to the PT that she was not cut off from medicine, but referred to pain management as a part of the treatment plan process  We explained that non compliance with this appointment resulted in them being unwilling to see her and that our office is not able to then prescribe her the medication any further  The PT stated that she would seek legal advice and became agitated when reiterated to the office policies, specifically dealing with pain management  NANETTE continued to try to explain the possible options to the PT but she hung up during this conversation

## 2018-08-24 ENCOUNTER — TELEPHONE (OUTPATIENT)
Dept: FAMILY MEDICINE CLINIC | Facility: CLINIC | Age: 53
End: 2018-08-24

## 2018-08-24 NOTE — TELEPHONE ENCOUNTER
As it has been clearly documented by both Shmuel Vincent and Kamlesh Harman given patient's NO SHOW to pain management we will NO longer prescribe pain medications for her  Patient is well aware of this since she was informed both by Shmuel Vincent and Kamlesh Harman

## 2018-09-17 DIAGNOSIS — K21.00 REFLUX ESOPHAGITIS: ICD-10-CM

## 2018-09-17 DIAGNOSIS — F41.9 ANXIETY DISORDER, UNSPECIFIED TYPE: ICD-10-CM

## 2018-09-18 RX ORDER — PAROXETINE HYDROCHLORIDE 20 MG/1
20 TABLET, FILM COATED ORAL DAILY
Qty: 30 TABLET | Refills: 2 | Status: SHIPPED | OUTPATIENT
Start: 2018-09-18

## 2018-09-18 RX ORDER — HYDROXYZINE 50 MG/1
50 TABLET, FILM COATED ORAL EVERY 6 HOURS PRN
Qty: 120 TABLET | Refills: 2 | Status: SHIPPED | OUTPATIENT
Start: 2018-09-18

## 2018-09-18 NOTE — TELEPHONE ENCOUNTER
Dr Erwin Rasheed- MYRNA    S/w representative from [facility], she states pt has filed a complaint because she was denied to reschedule a consult w/ [facility] to see Dr Erwin Rasheed because of one no show   Marshall will contact office if needed    Ref to notes 7/13/18

## 2018-09-24 ENCOUNTER — TELEPHONE (OUTPATIENT)
Dept: FAMILY MEDICINE CLINIC | Facility: CLINIC | Age: 53
End: 2018-09-24

## 2018-09-24 NOTE — TELEPHONE ENCOUNTER
Sent email to RODRI Xavier REP Re: [facility] Records Request - Third Attempt Received- As per Keren of [address] CD was mailed to the requester on 9/11/18- -     A cover sheet was faxed to [facility] indicating the above with [facility]'s telephone #

## 2018-10-23 DIAGNOSIS — K21.00 REFLUX ESOPHAGITIS: ICD-10-CM

## 2018-10-23 RX ORDER — OMEPRAZOLE 20 MG/1
20 CAPSULE, DELAYED RELEASE ORAL DAILY
Qty: 90 CAPSULE | OUTPATIENT
Start: 2018-10-23

## 2018-12-10 DIAGNOSIS — K21.00 REFLUX ESOPHAGITIS: ICD-10-CM

## 2018-12-11 RX ORDER — PAROXETINE HYDROCHLORIDE 20 MG/1
20 TABLET, FILM COATED ORAL DAILY
Qty: 30 TABLET | OUTPATIENT
Start: 2018-12-11

## 2019-01-16 DIAGNOSIS — K21.00 REFLUX ESOPHAGITIS: ICD-10-CM

## 2019-01-16 RX ORDER — OMEPRAZOLE 20 MG/1
20 CAPSULE, DELAYED RELEASE ORAL DAILY
Qty: 90 CAPSULE | OUTPATIENT
Start: 2019-01-16

## 2019-02-13 DIAGNOSIS — K21.00 REFLUX ESOPHAGITIS: ICD-10-CM

## 2019-02-13 RX ORDER — OMEPRAZOLE 20 MG/1
20 CAPSULE, DELAYED RELEASE ORAL DAILY
Qty: 90 CAPSULE | Refills: 1 | OUTPATIENT
Start: 2019-02-13

## 2019-03-12 ENCOUNTER — APPOINTMENT (EMERGENCY)
Dept: RADIOLOGY | Facility: HOSPITAL | Age: 54
End: 2019-03-12

## 2019-03-12 ENCOUNTER — HOSPITAL ENCOUNTER (EMERGENCY)
Facility: HOSPITAL | Age: 54
Discharge: HOME | End: 2019-03-12
Attending: EMERGENCY MEDICINE

## 2019-03-12 VITALS
SYSTOLIC BLOOD PRESSURE: 170 MMHG | BODY MASS INDEX: 19.6 KG/M2 | DIASTOLIC BLOOD PRESSURE: 97 MMHG | OXYGEN SATURATION: 100 % | WEIGHT: 107.14 LBS | TEMPERATURE: 97.3 F | RESPIRATION RATE: 18 BRPM | HEART RATE: 78 BPM

## 2019-03-12 DIAGNOSIS — S69.91XA INJURY OF FINGER OF RIGHT HAND, INITIAL ENCOUNTER: Primary | ICD-10-CM

## 2019-03-12 PROCEDURE — 73130 X-RAY EXAM OF HAND: CPT

## 2019-03-12 PROCEDURE — 99283 EMERGENCY DEPT VISIT LOW MDM: CPT

## 2019-03-12 NOTE — ED PROVIDER NOTES
History  Chief Complaint   Patient presents with    Finger Injury     shut right 3rd digit in car door  Patient is a 25-year-old female who presents today with chief complaint of right 3rd digit pain  Patient reports she slammed her digit in the door of her car approximately 30 minutes prior to ER arrival   Patient reports she is up-to-date on her tetanus vaccine has had bleeding from the area since  Patient reports no numbness, tingling, weakness, paresthesias associated with the pain  History provided by:  Patient  Finger Laceration   Location:  Finger  Finger laceration location:  R middle finger  Length:  2 cm  Depth:  Cutaneous  Quality: straight    Bleeding: venous and controlled    Time since incident:  30 minutes  Laceration mechanism:  Metal edge (car door)  Pain details:     Quality:  Aching    Severity:  Mild    Timing:  Constant    Progression:  Unchanged  Foreign body present:  No foreign bodies  Relieved by:  None tried  Worsened by:  Nothing  Ineffective treatments:  None tried  Tetanus status:  Up to date  Associated symptoms: redness    Associated symptoms: no fever and no rash        Prior to Admission Medications   Prescriptions Last Dose Informant Patient Reported? Taking? PARoxetine (PAXIL) 20 mg tablet   No No   Sig: Take 1 tablet (20 mg total) by mouth daily   albuterol (VENTOLIN HFA) 90 mcg/act inhaler   No No   Sig: Inhale 2 puffs every 6 (six) hours as needed for wheezing   fenofibrate (TRICOR) 145 mg tablet   Yes No   Sig: Take 1 tablet by mouth daily   fluticasone-salmeterol (ADVAIR DISKUS) 500-50 mcg/dose   Yes No   Sig: Inhale 1 puff 2 (two) times a day   gabapentin (NEURONTIN) 100 mg capsule   Yes No   Sig: Take 100 mg by mouth 3 (three) times a day   hydrOXYzine HCL (ATARAX) 50 mg tablet   No No   Sig: Take 1 tablet (50 mg total) by mouth every 6 (six) hours as needed for anxiety   lamoTRIgine (LaMICtal) 100 mg tablet   Yes No   Sig: INDICATIONS  EPILEPSY   TAKE 2 TABLETS  BY MOUTH IN THE MORNING AND 2 & 1/2 TABLETS AT NIGHT  lisinopril (ZESTRIL) 10 mg tablet   Yes No   Sig: Take 1 tablet by mouth daily   methocarbamol (ROBAXIN) 500 mg tablet   Yes No   Si mg 3 (three) times a day   montelukast (SINGULAIR) 10 mg tablet   Yes No   Sig: Take 1 tablet by mouth daily   omeprazole (PriLOSEC) 20 mg delayed release capsule   No No   Sig: Take 1 capsule (20 mg total) by mouth daily   oxyCODONE (ROXICODONE) 10 MG TABS   No No   Sig: Take 1 tablet (10 mg total) by mouth every 8 (eight) hours as needed for moderate pain Max Daily Amount: 30 mg   phenazopyridine (PYRIDIUM) 200 mg tablet   No No   Sig: Take 1 tablet (200 mg total) by mouth 3 (three) times a day with meals      Facility-Administered Medications: None       Past Medical History:   Diagnosis Date    Anxiety     Back pain     Cyst of joint of shoulder     Depression     Disease of thyroid gland     Gastritis     Gastroparesis     H/O ovarian cystectomy     Irritable bowel     Lumbar herniated disc     Migraine     Seizures (HCC)     Spinal stenosis     TMJ disease        Past Surgical History:   Procedure Laterality Date    CHOLECYSTECTOMY      FOOT SURGERY      HYSTERECTOMY      MANDIBLE SURGERY      OVARIAN CYST REMOVAL      ROTATOR CUFF REPAIR      TEMPOROMANDIBULAR JOINT SURGERY      Condylotomy of TMJ     TUBAL LIGATION      WRIST FRACTURE SURGERY         Family History   Problem Relation Age of Onset    Coronary artery disease Mother     Diabetes Mother     Depression Mother     Stomach cancer Mother     Coronary artery disease Father     Alcohol abuse Father     Hypertension Father     Kidney failure Father      I have reviewed and agree with the history as documented  Social History     Tobacco Use    Smoking status: Current Every Day Smoker     Packs/day: 0 50     Types: Cigarettes    Smokeless tobacco: Never Used   Substance Use Topics    Alcohol use: No    Drug use:  No Comment: Per [employer]: Narcotic Drug Use         Review of Systems   Constitutional: Negative for chills, fatigue and fever  HENT: Negative for congestion, ear pain, rhinorrhea and sore throat  Eyes: Negative for redness  Respiratory: Negative for chest tightness and shortness of breath  Cardiovascular: Negative for chest pain and palpitations  Gastrointestinal: Negative for abdominal pain, nausea and vomiting  Genitourinary: Negative for dysuria and hematuria  Musculoskeletal: Negative  Skin: Positive for wound  Negative for rash  Neurological: Negative for dizziness, syncope, light-headedness and numbness  Physical Exam  Physical Exam   Constitutional: She is oriented to person, place, and time  She appears well-developed and well-nourished  HENT:   Head: Normocephalic  Eyes: No scleral icterus  Cardiovascular: Normal rate and regular rhythm  Pulmonary/Chest: Effort normal and breath sounds normal  No stridor  Abdominal: Soft  She exhibits no distension  There is no tenderness  Musculoskeletal: Normal range of motion  She exhibits tenderness  Right hand: She exhibits tenderness, bony tenderness, laceration ( superficial, patient requested bandaging) and swelling  She exhibits normal range of motion  Hands:  Cutaneous laceration noted just proximal to the nailbed  No disruption of nailbed noted, no laceration to the nailbed  Neurological: She is alert and oriented to person, place, and time  Skin: Skin is warm and dry  Capillary refill takes less than 2 seconds  Psychiatric: She has a normal mood and affect  Nursing note and vitals reviewed        Vital Signs  ED Triage Vitals [03/12/19 1553]   Temperature Pulse Respirations Blood Pressure SpO2   (!) 97 3 °F (36 3 °C) 78 18 170/97 100 %      Temp Source Heart Rate Source Patient Position - Orthostatic VS BP Location FiO2 (%)   Oral -- Sitting Left arm --      Pain Score       8           Vitals: 03/12/19 1553   BP: 170/97   Pulse: 78   Patient Position - Orthostatic VS: Sitting       Homero     Row Name 03/12/19 1553                Altered mental status GCS < 15          Respiratory Rate > / =38  0        Systolic BP < / =319  0        Q Sofa Score  0              Visual Acuity      ED Medications  Medications - No data to display    Diagnostic Studies  Results Reviewed     None                 XR hand 3+ views RIGHT   ED Interpretation by Felecia Coon PA-C (03/12 6874)   No acute osseous abnormalities noted  Procedures  Static Splint Application  Date/Time: 3/12/2019 4:28 PM  Performed by: Felecia Coon PA-C  Authorized by: Felecia Coon PA-C     Patient location:  Bedside  Procedure performed by emergency physician: No    Other Assisting Provider: Yes (comment)    Consent:     Consent obtained:  Verbal    Consent given by:  Patient    Risks discussed:  Pain    Alternatives discussed:  No treatment (Option of gluing the laceration was discussed with the patient, patient opted for no laceration repair and was informed sutures were not indicated due to the depth of the laceration )  Universal protocol:     Procedure explained and questions answered to patient or proxy's satisfaction: yes      Patient identity confirmed:  Verbally with patient  Indication:     Indications: sprain/strain    Pre-procedure details:     Sensation:  Normal  Procedure details:     Laterality:  Right    Location:  Finger    Finger:  R long finger    Splint type:  Finger splint, static  Post-procedure details:     Pain:  Unchanged    Sensation:  Normal    Neurovascular Exam: skin pink      Patient tolerance of procedure:   Tolerated well, no immediate complications           Phone Contacts  ED Phone Contact    ED Course                               MDM    Disposition  Final diagnoses:   Injury of finger of right hand, initial encounter     Time reflects when diagnosis was documented in both MDM as applicable and the Disposition within this note     Time User Action Codes Description Comment    3/12/2019  4:38 PM Feliz, Dawson[address] [facility] [address] Injury of finger of right hand, initial encounter       ED Disposition     ED Disposition Condition Date/Time Comment    Discharge Good Tue Mar 12, 2019  4:38 PM [facility] discharge to home/self care  Follow-up Information     Follow up With Specialties Details Why Contact Info    Edelmira Robledo MD Orthopedic Surgery Schedule an appointment as soon as possible for a visit in 2 days  [address]  [phone]            Discharge Medication List as of 3/12/2019  4:39 PM      CONTINUE these medications which have NOT CHANGED    Details   albuterol (VENTOLIN HFA) 90 mcg/act inhaler Inhale 2 puffs every 6 (six) hours as needed for wheezing, Starting Mon 7/2/2018, Normal      fenofibrate (TRICOR) 145 mg tablet Take 1 tablet by mouth daily, Starting Tue 2/2/2016, Historical Med      fluticasone-salmeterol (ADVAIR DISKUS) 500-50 mcg/dose Inhale 1 puff 2 (two) times a day, Starting Wed 8/27/2014, Historical Med      gabapentin (NEURONTIN) 100 mg capsule Take 100 mg by mouth 3 (three) times a day, Starting Mon 3/19/2018, Historical Med      hydrOXYzine HCL (ATARAX) 50 mg tablet Take 1 tablet (50 mg total) by mouth every 6 (six) hours as needed for anxiety, Starting Tue 9/18/2018, Normal      lamoTRIgine (LaMICtal) 100 mg tablet INDICATIONS  EPILEPSY   TAKE 2 TABLETS  BY MOUTH IN THE MORNING AND 2 & 1/2 TABLETS AT NIGHT , Historical Med      lisinopril (ZESTRIL) 10 mg tablet Take 1 tablet by mouth daily, Starting Fri 4/1/2016, Historical Med      methocarbamol (ROBAXIN) 500 mg tablet 500 mg 3 (three) times a day, Starting Fri 2/23/2018, Historical Med      montelukast (SINGULAIR) 10 mg tablet Take 1 tablet by mouth daily, Starting Fri 9/15/2017, Historical Med      omeprazole (PriLOSEC) 20 mg delayed release capsule Take 1 capsule (20 mg total) by mouth daily, Starting Wed 8/1/2018, Normal      oxyCODONE (ROXICODONE) 10 MG TABS Take 1 tablet (10 mg total) by mouth every 8 (eight) hours as needed for moderate pain Max Daily Amount: 30 mg, Starting Wed 8/1/2018, Normal      PARoxetine (PAXIL) 20 mg tablet Take 1 tablet (20 mg total) by mouth daily, Starting Tue 9/18/2018, Normal      phenazopyridine (PYRIDIUM) 200 mg tablet Take 1 tablet (200 mg total) by mouth 3 (three) times a day with meals, Starting Fri 3/16/2018, Normal           No discharge procedures on file      ED Provider  Electronically Signed by           Angeles Rendon PA-C  03/13/19 0106

## 2020-11-28 NOTE — ED NOTES
Pt refusing additional IV attempts for the Ct with Dye  Dr Vigil Stager aware   Verbal order for a CT without contrast      Karis Brittle, RN  03/13/18 7849 Yes

## 2021-12-24 ENCOUNTER — APPOINTMENT (EMERGENCY)
Dept: CT IMAGING | Facility: HOSPITAL | Age: 56
End: 2021-12-24
Payer: MEDICARE

## 2021-12-24 ENCOUNTER — HOSPITAL ENCOUNTER (EMERGENCY)
Facility: HOSPITAL | Age: 56
Discharge: HOME | End: 2021-12-24
Attending: EMERGENCY MEDICINE | Admitting: EMERGENCY MEDICINE
Payer: MEDICARE

## 2021-12-24 VITALS
DIASTOLIC BLOOD PRESSURE: 98 MMHG | OXYGEN SATURATION: 97 % | BODY MASS INDEX: 19.1 KG/M2 | HEART RATE: 87 BPM | WEIGHT: 104.4 LBS | TEMPERATURE: 97.6 F | SYSTOLIC BLOOD PRESSURE: 150 MMHG | RESPIRATION RATE: 18 BRPM

## 2021-12-24 DIAGNOSIS — R10.9 LEFT FLANK PAIN: Primary | ICD-10-CM

## 2021-12-24 DIAGNOSIS — N20.0 NEPHROLITHIASIS: ICD-10-CM

## 2021-12-24 LAB
ALBUMIN SERPL BCP-MCNC: 4.5 G/DL (ref 3–5.2)
ALP SERPL-CCNC: 118 U/L (ref 43–122)
ALT SERPL W P-5'-P-CCNC: 17 U/L
AMPHETAMINES SERPL QL SCN: NEGATIVE
ANION GAP SERPL CALCULATED.3IONS-SCNC: 4 MMOL/L (ref 5–14)
AST SERPL W P-5'-P-CCNC: 25 U/L (ref 14–36)
BACTERIA UR QL AUTO: NORMAL /HPF
BARBITURATES UR QL: NEGATIVE
BASOPHILS # BLD AUTO: 0.1 THOUSANDS/ΜL (ref 0–0.1)
BASOPHILS NFR BLD AUTO: 1 % (ref 0–1)
BENZODIAZ UR QL: POSITIVE
BILIRUB SERPL-MCNC: 0.35 MG/DL
BILIRUB UR QL STRIP: NEGATIVE
BUN SERPL-MCNC: 19 MG/DL (ref 5–25)
CALCIUM SERPL-MCNC: 9.1 MG/DL (ref 8.4–10.2)
CHLORIDE SERPL-SCNC: 104 MMOL/L (ref 97–108)
CLARITY UR: CLEAR
CO2 SERPL-SCNC: 31 MMOL/L (ref 22–30)
COCAINE UR QL: POSITIVE
COLOR UR: ABNORMAL
CREAT SERPL-MCNC: 0.8 MG/DL (ref 0.6–1.2)
EOSINOPHIL # BLD AUTO: 0.1 THOUSAND/ΜL (ref 0–0.4)
EOSINOPHIL NFR BLD AUTO: 1 % (ref 0–6)
ERYTHROCYTE [DISTWIDTH] IN BLOOD BY AUTOMATED COUNT: 13.4 %
GFR SERPL CREATININE-BSD FRML MDRD: 82 ML/MIN/1.73SQ M
GLUCOSE SERPL-MCNC: 135 MG/DL (ref 70–99)
GLUCOSE UR STRIP-MCNC: NEGATIVE MG/DL
HCT VFR BLD AUTO: 41.5 % (ref 36–46)
HGB BLD-MCNC: 14 G/DL (ref 12–16)
HGB UR QL STRIP.AUTO: 10
KETONES UR STRIP-MCNC: ABNORMAL MG/DL
LEUKOCYTE ESTERASE UR QL STRIP: NEGATIVE
LIPASE SERPL-CCNC: 43 U/L (ref 23–300)
LYMPHOCYTES # BLD AUTO: 2.1 THOUSANDS/ΜL (ref 0.5–4)
LYMPHOCYTES NFR BLD AUTO: 18 % (ref 25–45)
MCH RBC QN AUTO: 31.2 PG (ref 26–34)
MCHC RBC AUTO-ENTMCNC: 33.8 G/DL (ref 31–36)
MCV RBC AUTO: 92 FL (ref 80–100)
METHADONE UR QL: NEGATIVE
MONOCYTES # BLD AUTO: 0.9 THOUSAND/ΜL (ref 0.2–0.9)
MONOCYTES NFR BLD AUTO: 8 % (ref 1–10)
NEUTROPHILS # BLD AUTO: 8.5 THOUSANDS/ΜL (ref 1.8–7.8)
NEUTS SEG NFR BLD AUTO: 73 % (ref 45–65)
NITRITE UR QL STRIP: NEGATIVE
NON-SQ EPI CELLS URNS QL MICRO: NORMAL /HPF
OPIATES UR QL SCN: NEGATIVE
OXYCODONE+OXYMORPHONE UR QL SCN: NEGATIVE
PCP UR QL: NEGATIVE
PH UR STRIP.AUTO: 7 [PH]
PLATELET # BLD AUTO: 269 THOUSANDS/UL (ref 150–450)
PMV BLD AUTO: 8.3 FL (ref 8.9–12.7)
POTASSIUM SERPL-SCNC: 3.4 MMOL/L (ref 3.6–5)
PROT SERPL-MCNC: 7.8 G/DL (ref 5.9–8.4)
PROT UR STRIP-MCNC: NEGATIVE MG/DL
RBC # BLD AUTO: 4.49 MILLION/UL (ref 4–5.2)
RBC #/AREA URNS AUTO: NORMAL /HPF
SODIUM SERPL-SCNC: 139 MMOL/L (ref 137–147)
SP GR UR STRIP.AUTO: 1.01 (ref 1–1.04)
THC UR QL: NEGATIVE
UROBILINOGEN UA: NEGATIVE MG/DL
WBC # BLD AUTO: 11.7 THOUSAND/UL (ref 4.5–11)
WBC #/AREA URNS AUTO: NORMAL /HPF

## 2021-12-24 PROCEDURE — G1004 CDSM NDSC: HCPCS

## 2021-12-24 PROCEDURE — 85025 COMPLETE CBC W/AUTO DIFF WBC: CPT | Performed by: EMERGENCY MEDICINE

## 2021-12-24 PROCEDURE — 81001 URINALYSIS AUTO W/SCOPE: CPT | Performed by: EMERGENCY MEDICINE

## 2021-12-24 PROCEDURE — 99284 EMERGENCY DEPT VISIT MOD MDM: CPT

## 2021-12-24 PROCEDURE — 80307 DRUG TEST PRSMV CHEM ANLYZR: CPT | Performed by: EMERGENCY MEDICINE

## 2021-12-24 PROCEDURE — 80053 COMPREHEN METABOLIC PANEL: CPT | Performed by: EMERGENCY MEDICINE

## 2021-12-24 PROCEDURE — 74177 CT ABD & PELVIS W/CONTRAST: CPT

## 2021-12-24 PROCEDURE — 83690 ASSAY OF LIPASE: CPT | Performed by: EMERGENCY MEDICINE

## 2021-12-24 PROCEDURE — 96361 HYDRATE IV INFUSION ADD-ON: CPT

## 2021-12-24 PROCEDURE — 96374 THER/PROPH/DIAG INJ IV PUSH: CPT

## 2021-12-24 PROCEDURE — 80175 DRUG SCREEN QUAN LAMOTRIGINE: CPT | Performed by: EMERGENCY MEDICINE

## 2021-12-24 PROCEDURE — 99285 EMERGENCY DEPT VISIT HI MDM: CPT | Performed by: EMERGENCY MEDICINE

## 2021-12-24 PROCEDURE — 96375 TX/PRO/DX INJ NEW DRUG ADDON: CPT

## 2021-12-24 PROCEDURE — 36415 COLL VENOUS BLD VENIPUNCTURE: CPT | Performed by: EMERGENCY MEDICINE

## 2021-12-24 RX ORDER — OXYCODONE HYDROCHLORIDE 5 MG/1
5 TABLET ORAL EVERY 4 HOURS PRN
Qty: 5 TABLET | Refills: 0 | Status: SHIPPED | OUTPATIENT
Start: 2021-12-24 | End: 2021-12-26

## 2021-12-24 RX ORDER — KETAMINE HCL IN NACL, ISO-OSM 100MG/10ML
0.5 SYRINGE (ML) INJECTION ONCE
Status: DISCONTINUED | OUTPATIENT
Start: 2021-12-24 | End: 2021-12-24 | Stop reason: HOSPADM

## 2021-12-24 RX ORDER — MIDAZOLAM HYDROCHLORIDE 2 MG/2ML
1 INJECTION, SOLUTION INTRAMUSCULAR; INTRAVENOUS ONCE
Status: COMPLETED | OUTPATIENT
Start: 2021-12-24 | End: 2021-12-24

## 2021-12-24 RX ORDER — FENTANYL CITRATE 50 UG/ML
25 INJECTION, SOLUTION INTRAMUSCULAR; INTRAVENOUS ONCE
Status: COMPLETED | OUTPATIENT
Start: 2021-12-24 | End: 2021-12-24

## 2021-12-24 RX ORDER — ONDANSETRON 4 MG/1
4 TABLET, ORALLY DISINTEGRATING ORAL EVERY 6 HOURS PRN
Qty: 10 TABLET | Refills: 0 | Status: SHIPPED | OUTPATIENT
Start: 2021-12-24

## 2021-12-24 RX ORDER — TAMSULOSIN HYDROCHLORIDE 0.4 MG/1
0.4 CAPSULE ORAL
Qty: 7 CAPSULE | Refills: 0 | Status: SHIPPED | OUTPATIENT
Start: 2021-12-24 | End: 2021-12-31

## 2021-12-24 RX ORDER — LIDOCAINE 50 MG/G
1 PATCH TOPICAL ONCE
Status: DISCONTINUED | OUTPATIENT
Start: 2021-12-24 | End: 2021-12-24 | Stop reason: HOSPADM

## 2021-12-24 RX ADMIN — IOHEXOL 100 ML: 350 INJECTION, SOLUTION INTRAVENOUS at 02:22

## 2021-12-24 RX ADMIN — MIDAZOLAM 1 MG: 1 INJECTION INTRAMUSCULAR; INTRAVENOUS at 01:54

## 2021-12-24 RX ADMIN — LIDOCAINE 1 PATCH: 50 PATCH TOPICAL at 03:17

## 2021-12-24 RX ADMIN — FENTANYL CITRATE 25 MCG: 50 INJECTION, SOLUTION INTRAMUSCULAR; INTRAVENOUS at 00:45

## 2021-12-24 RX ADMIN — SODIUM CHLORIDE 1000 ML: 0.9 INJECTION, SOLUTION INTRAVENOUS at 01:36

## 2021-12-27 LAB — LAMOTRIGINE SERPL-MCNC: <1 UG/ML (ref 2–20)

## 2022-03-29 ENCOUNTER — NURSE TRIAGE (OUTPATIENT)
Dept: OTHER | Facility: OTHER | Age: 57
End: 2022-03-29

## 2022-03-29 NOTE — TELEPHONE ENCOUNTER
Reason for Disposition   [1] SEVERE pain (e g , excruciating, unable to do any normal activities) AND [2] not improved 2 hours after pain medicine    Answer Assessment - Initial Assessment Questions  1  LOCATION: "Which tooth is hurting?"  (e g , right-side/left-side, upper/lower, front/back)      Left bottom posterior    2  ONSET: "When did the toothache start?"  (e g , hours, days)       This mornign    3  SEVERITY: "How bad is the toothache?"  (Scale 1-10; mild, moderate or severe)    - MILD (1-3): doesn't interfere with chewing     - MODERATE (4-7): interferes with chewing, interferes with normal activities, awakens from sleep      - SEVERE (8-10): unable to eat, unable to do any normal activities, excruciating pain         Severe    4  SWELLING: "Is there any visible swelling of your face?"      Denies    5  OTHER SYMPTOMS: "Do you have any other symptoms?" (e g , fever)      Gums red and swollen    Pt reports that she was seen in the office yesterday for this but the pain just started worsening today      Protocols used: [id number]

## 2022-03-29 NOTE — TELEPHONE ENCOUNTER
Regarding: tooth ache/gum pain   ----- Message from [facility] TODD sent at 3/29/2022  5:33 PM EDT -----  "I am calling because I go to [facility] and im trying to see if something can be sent in for me like an abx, im having a severe tooth ache and gum pain "

## 2022-03-29 NOTE — TELEPHONE ENCOUNTER
Pt calling concerned about symptoms as noted in initial assessment  Protocol disposition was for pt to be seen by a provider within 4 hours  Pt did not want to go to the [facility] or ED because she just seen her doctor yesterday  She is requesting antibiotics be sent in  Attempted to contact on call provider twice without an answer or return call  Attempted to contact pt twice to make her aware of above and received VM as well on both attempts  Left VM to go to  C/ED or call back with additional concerns

## 2022-03-30 NOTE — TELEPHONE ENCOUNTER
On call provider returned my call  Reviewed pts symptoms with her and informed her I advised [facility] or ER due to severe pain, but that pt wished to have an antibiotic called in instead  She will follow up with pt tomorrow

## 2024-10-13 NOTE — ED NOTES
Pt c/o pain and not getting pain medicine    Resident notified and at bedside      Gurdeep Sandoval, [address] [facility]  03/13/18 1940 No